# Patient Record
Sex: FEMALE | Race: WHITE | NOT HISPANIC OR LATINO | URBAN - METROPOLITAN AREA
[De-identification: names, ages, dates, MRNs, and addresses within clinical notes are randomized per-mention and may not be internally consistent; named-entity substitution may affect disease eponyms.]

---

## 2017-01-26 ENCOUNTER — INPATIENT (INPATIENT)
Facility: HOSPITAL | Age: 82
LOS: 11 days | Discharge: NURSING, REHAB OR LONG-TERM CARE | End: 2017-02-07
Attending: INTERNAL MEDICINE | Admitting: INTERNAL MEDICINE
Payer: MEDICARE

## 2017-01-26 VITALS
SYSTOLIC BLOOD PRESSURE: 90 MMHG | DIASTOLIC BLOOD PRESSURE: 54 MMHG | TEMPERATURE: 98 F | OXYGEN SATURATION: 99 % | HEART RATE: 104 BPM | RESPIRATION RATE: 16 BRPM

## 2017-01-26 DIAGNOSIS — R41.82 ALTERED MENTAL STATUS, UNSPECIFIED: ICD-10-CM

## 2017-01-26 LAB
ALBUMIN SERPL ELPH-MCNC: 3.2 G/DL — LOW (ref 3.3–5)
ALP SERPL-CCNC: 116 U/L — SIGNIFICANT CHANGE UP (ref 40–120)
ALT FLD-CCNC: 18 U/L — SIGNIFICANT CHANGE UP (ref 4–33)
APPEARANCE UR: SIGNIFICANT CHANGE UP
APTT BLD: 30.3 SEC — SIGNIFICANT CHANGE UP (ref 27.5–37.4)
AST SERPL-CCNC: 25 U/L — SIGNIFICANT CHANGE UP (ref 4–32)
BACTERIA # UR AUTO: SIGNIFICANT CHANGE UP
BASE EXCESS BLDV CALC-SCNC: -10.9 MMOL/L — SIGNIFICANT CHANGE UP
BASOPHILS # BLD AUTO: 0.03 K/UL — SIGNIFICANT CHANGE UP (ref 0–0.2)
BASOPHILS NFR BLD AUTO: 0.2 % — SIGNIFICANT CHANGE UP (ref 0–2)
BILIRUB SERPL-MCNC: 0.3 MG/DL — SIGNIFICANT CHANGE UP (ref 0.2–1.2)
BILIRUB UR-MCNC: NEGATIVE — SIGNIFICANT CHANGE UP
BLOOD GAS VENOUS - CREATININE: 6.89 MG/DL — HIGH (ref 0.5–1.3)
BLOOD UR QL VISUAL: HIGH
BUN SERPL-MCNC: 108 MG/DL — HIGH (ref 7–23)
CALCIUM SERPL-MCNC: 9.5 MG/DL — SIGNIFICANT CHANGE UP (ref 8.4–10.5)
CHLORIDE BLDV-SCNC: 109 MMOL/L — HIGH (ref 96–108)
CHLORIDE SERPL-SCNC: 100 MMOL/L — SIGNIFICANT CHANGE UP (ref 98–107)
CO2 SERPL-SCNC: 14 MMOL/L — LOW (ref 22–31)
COLOR SPEC: YELLOW — SIGNIFICANT CHANGE UP
CREAT SERPL-MCNC: 6.22 MG/DL — HIGH (ref 0.5–1.3)
EOSINOPHIL # BLD AUTO: 0.09 K/UL — SIGNIFICANT CHANGE UP (ref 0–0.5)
EOSINOPHIL NFR BLD AUTO: 0.7 % — SIGNIFICANT CHANGE UP (ref 0–6)
GAS PNL BLDV: 139 MMOL/L — SIGNIFICANT CHANGE UP (ref 136–146)
GLUCOSE BLDV-MCNC: 78 — SIGNIFICANT CHANGE UP (ref 70–99)
GLUCOSE SERPL-MCNC: 78 MG/DL — SIGNIFICANT CHANGE UP (ref 70–99)
GLUCOSE UR-MCNC: NEGATIVE — SIGNIFICANT CHANGE UP
HCO3 BLDV-SCNC: 15 MMOL/L — LOW (ref 20–27)
HCT VFR BLD CALC: 36.9 % — SIGNIFICANT CHANGE UP (ref 34.5–45)
HCT VFR BLDV CALC: 35.1 % — SIGNIFICANT CHANGE UP (ref 34.5–45)
HGB BLD-MCNC: 11.9 G/DL — SIGNIFICANT CHANGE UP (ref 11.5–15.5)
HGB BLDV-MCNC: 11.4 G/DL — LOW (ref 11.5–15.5)
IMM GRANULOCYTES NFR BLD AUTO: 0.4 % — SIGNIFICANT CHANGE UP (ref 0–1.5)
INR BLD: 1.15 — SIGNIFICANT CHANGE UP (ref 0.87–1.18)
KETONES UR-MCNC: SIGNIFICANT CHANGE UP
LACTATE BLDV-MCNC: 3.8 MMOL/L — HIGH (ref 0.5–2)
LEUKOCYTE ESTERASE UR-ACNC: HIGH
LYMPHOCYTES # BLD AUTO: 31.9 % — SIGNIFICANT CHANGE UP (ref 13–44)
LYMPHOCYTES # BLD AUTO: 4.19 K/UL — HIGH (ref 1–3.3)
MCHC RBC-ENTMCNC: 27.9 PG — SIGNIFICANT CHANGE UP (ref 27–34)
MCHC RBC-ENTMCNC: 32.2 % — SIGNIFICANT CHANGE UP (ref 32–36)
MCV RBC AUTO: 86.4 FL — SIGNIFICANT CHANGE UP (ref 80–100)
MONOCYTES # BLD AUTO: 0.87 K/UL — SIGNIFICANT CHANGE UP (ref 0–0.9)
MONOCYTES NFR BLD AUTO: 6.6 % — SIGNIFICANT CHANGE UP (ref 2–14)
NEUTROPHILS # BLD AUTO: 7.9 K/UL — HIGH (ref 1.8–7.4)
NEUTROPHILS NFR BLD AUTO: 60.2 % — SIGNIFICANT CHANGE UP (ref 43–77)
NITRITE UR-MCNC: NEGATIVE — SIGNIFICANT CHANGE UP
PCO2 BLDV: 41 MMHG — SIGNIFICANT CHANGE UP (ref 41–51)
PH BLDV: 7.21 PH — LOW (ref 7.32–7.43)
PH UR: 8 — SIGNIFICANT CHANGE UP (ref 4.6–8)
PLATELET # BLD AUTO: 319 K/UL — SIGNIFICANT CHANGE UP (ref 150–400)
PMV BLD: 9.9 FL — SIGNIFICANT CHANGE UP (ref 7–13)
PO2 BLDV: 26 MMHG — LOW (ref 35–40)
POTASSIUM BLDV-SCNC: 5.8 MMOL/L — HIGH (ref 3.4–4.5)
POTASSIUM SERPL-MCNC: 6.5 MMOL/L — CRITICAL HIGH (ref 3.5–5.3)
POTASSIUM SERPL-SCNC: 6.5 MMOL/L — CRITICAL HIGH (ref 3.5–5.3)
PROT SERPL-MCNC: 8.1 G/DL — SIGNIFICANT CHANGE UP (ref 6–8.3)
PROT UR-MCNC: 300 — HIGH
PROTHROM AB SERPL-ACNC: 13.1 SEC — SIGNIFICANT CHANGE UP (ref 10–13.1)
RBC # BLD: 4.27 M/UL — SIGNIFICANT CHANGE UP (ref 3.8–5.2)
RBC # FLD: 17.3 % — HIGH (ref 10.3–14.5)
RBC CASTS # UR COMP ASSIST: SIGNIFICANT CHANGE UP (ref 0–?)
SAO2 % BLDV: 35.9 % — LOW (ref 60–85)
SODIUM SERPL-SCNC: 141 MMOL/L — SIGNIFICANT CHANGE UP (ref 135–145)
SP GR SPEC: 1.01 — SIGNIFICANT CHANGE UP (ref 1–1.03)
UROBILINOGEN FLD QL: NORMAL E.U. — SIGNIFICANT CHANGE UP (ref 0.1–0.2)
WBC # BLD: 13.13 K/UL — HIGH (ref 3.8–10.5)
WBC # FLD AUTO: 13.13 K/UL — HIGH (ref 3.8–10.5)
WBC CLUMPS #/AREA URNS HPF: PRESENT — HIGH (ref 0–?)
WBC UR QL: >50 — HIGH (ref 0–?)

## 2017-01-26 RX ORDER — CALCIUM GLUCONATE 100 MG/ML
1 VIAL (ML) INTRAVENOUS ONCE
Qty: 0 | Refills: 0 | Status: COMPLETED | OUTPATIENT
Start: 2017-01-26 | End: 2017-01-26

## 2017-01-26 RX ORDER — VANCOMYCIN HCL 1 G
1000 VIAL (EA) INTRAVENOUS ONCE
Qty: 0 | Refills: 0 | Status: COMPLETED | OUTPATIENT
Start: 2017-01-26 | End: 2017-01-26

## 2017-01-26 RX ORDER — PIPERACILLIN AND TAZOBACTAM 4; .5 G/20ML; G/20ML
3.38 INJECTION, POWDER, LYOPHILIZED, FOR SOLUTION INTRAVENOUS ONCE
Qty: 0 | Refills: 0 | Status: COMPLETED | OUTPATIENT
Start: 2017-01-26 | End: 2017-01-26

## 2017-01-26 RX ORDER — SODIUM CHLORIDE 9 MG/ML
1000 INJECTION INTRAMUSCULAR; INTRAVENOUS; SUBCUTANEOUS ONCE
Qty: 0 | Refills: 0 | Status: COMPLETED | OUTPATIENT
Start: 2017-01-26 | End: 2017-01-26

## 2017-01-26 RX ORDER — DEXTROSE 50 % IN WATER 50 %
50 SYRINGE (ML) INTRAVENOUS ONCE
Qty: 0 | Refills: 0 | Status: COMPLETED | OUTPATIENT
Start: 2017-01-26 | End: 2017-01-26

## 2017-01-26 RX ORDER — SODIUM BICARBONATE 1 MEQ/ML
50 SYRINGE (ML) INTRAVENOUS ONCE
Qty: 0 | Refills: 0 | Status: COMPLETED | OUTPATIENT
Start: 2017-01-26 | End: 2017-01-26

## 2017-01-26 RX ORDER — INSULIN HUMAN 100 [IU]/ML
5 INJECTION, SOLUTION SUBCUTANEOUS ONCE
Qty: 0 | Refills: 0 | Status: DISCONTINUED | OUTPATIENT
Start: 2017-01-26 | End: 2017-01-26

## 2017-01-26 RX ORDER — INSULIN HUMAN 100 [IU]/ML
5 INJECTION, SOLUTION SUBCUTANEOUS ONCE
Qty: 0 | Refills: 0 | Status: COMPLETED | OUTPATIENT
Start: 2017-01-26 | End: 2017-01-26

## 2017-01-26 RX ADMIN — Medication 50 MILLILITER(S): at 23:44

## 2017-01-26 RX ADMIN — INSULIN HUMAN 5 UNIT(S): 100 INJECTION, SOLUTION SUBCUTANEOUS at 23:44

## 2017-01-26 RX ADMIN — PIPERACILLIN AND TAZOBACTAM 200 GRAM(S): 4; .5 INJECTION, POWDER, LYOPHILIZED, FOR SOLUTION INTRAVENOUS at 23:00

## 2017-01-26 RX ADMIN — Medication 50 MILLIEQUIVALENT(S): at 23:44

## 2017-01-26 RX ADMIN — Medication 200 GRAM(S): at 23:43

## 2017-01-26 RX ADMIN — SODIUM CHLORIDE 1000 MILLILITER(S): 9 INJECTION INTRAMUSCULAR; INTRAVENOUS; SUBCUTANEOUS at 22:59

## 2017-01-26 NOTE — ED PROVIDER NOTE - OBJECTIVE STATEMENT
89 yo female PMH of HTN, CAD, CHF, PVD, OA, DJD referred from [facility] [place] 89 yo female PMH of HTN, CAD, CHF, PVD, OA, DJD referred from [facility] for acute renal failure. Pt noted to have Cr of 5, new, suspected sepsis. Pt nonverbal, unable to provide further hx.

## 2017-01-26 NOTE — ED ADULT NURSE NOTE - OBJECTIVE STATEMENT
Pt arrives to TRB. Pt awake, non-verbal, and responds to painful stimuli. Pt arrives with bandages bilaterally to feet. Skin on feet peeling, crusted, numerous minor Stage I and Stage II pressure ulcers noted to feet. Pt arrives with urine soaked diaper. Pts vaginal area grossly inflamed, reddened and skin raw appearing. Skin care performed. Pt str cath per MD order with RN Cinthya and CCRN Lula present. Pts urine extremely cloudy and foul smelling. FS=82 at 9:45pm. Labs as ordered. Vitals as noted including rectal temperature.

## 2017-01-26 NOTE — ED PROVIDER NOTE - CARE PLAN
Principal Discharge DX:	Altered mental status Principal Discharge DX:	Altered mental status  Secondary Diagnosis:	Urinary tract infection, site unspecified  Secondary Diagnosis:	YESSI (acute kidney injury)

## 2017-01-26 NOTE — ED ADULT NURSE NOTE - CHIEF COMPLAINT QUOTE
Pt sent from [facility] for abnormal labs.  Pt hypotensive, awake & alert in triage.  18G saline lock noted to left forearm.  Per paperwork, pt is FULL CODE.

## 2017-01-26 NOTE — ED PROVIDER NOTE - PROGRESS NOTE DETAILS
Pt stable. Labs appreciated, will admit for further workup. Dr Suero and MAR aware.  Yuniel Victoria MD PGY-3 [place]: pt already admitted. BP though lower. No crackles or LE edema. Will give additional L IFV. Notified MAR who will come down to [place] pt.

## 2017-01-26 NOTE — ED ADULT TRIAGE NOTE - CHIEF COMPLAINT QUOTE
Pt sent from [facility] for abnormal labs.  Pt hypotensive, awake & alert in triage.  Per paperwork, pt is FULL CODE. Pt sent from [facility] for abnormal labs.  Pt hypotensive, awake & alert in triage.  18G saline lock noted to left forearm.  Per paperwork, pt is FULL CODE.

## 2017-01-26 NOTE — ED ADULT NURSE NOTE - PMH
CAD (coronary artery disease)    CHF (congestive heart failure)    DJD (degenerative joint disease)    Dry eye syndrome    Hyperlipidemia    Hypertension    Nonhealing nonsurgical wound    Osteopenia    PVD (peripheral vascular disease)    Xerosis of skin

## 2017-01-26 NOTE — ED PROVIDER NOTE - ATTENDING CONTRIBUTION TO CARE
89 yo female alzheimers sent from [facility] for abnormal labs.  Pt hypotensive, awake & alert in triage agitated aggressive rrr cta abd soft pressure ulcers @ rectum //labs urine cxr sepsis work up admit

## 2017-01-27 DIAGNOSIS — E87.5 HYPERKALEMIA: ICD-10-CM

## 2017-01-27 DIAGNOSIS — A41.9 SEPSIS, UNSPECIFIED ORGANISM: ICD-10-CM

## 2017-01-27 DIAGNOSIS — Z41.8 ENCOUNTER FOR OTHER PROCEDURES FOR PURPOSES OTHER THAN REMEDYING HEALTH STATE: ICD-10-CM

## 2017-01-27 DIAGNOSIS — I50.9 HEART FAILURE, UNSPECIFIED: ICD-10-CM

## 2017-01-27 DIAGNOSIS — I10 ESSENTIAL (PRIMARY) HYPERTENSION: ICD-10-CM

## 2017-01-27 DIAGNOSIS — L89.90 PRESSURE ULCER OF UNSPECIFIED SITE, UNSPECIFIED STAGE: ICD-10-CM

## 2017-01-27 DIAGNOSIS — N17.9 ACUTE KIDNEY FAILURE, UNSPECIFIED: ICD-10-CM

## 2017-01-27 DIAGNOSIS — I25.10 ATHEROSCLEROTIC HEART DISEASE OF NATIVE CORONARY ARTERY WITHOUT ANGINA PECTORIS: ICD-10-CM

## 2017-01-27 LAB
BASE EXCESS BLDV CALC-SCNC: -8 MMOL/L — SIGNIFICANT CHANGE UP
BASOPHILS # BLD AUTO: 0.02 K/UL — SIGNIFICANT CHANGE UP (ref 0–0.2)
BASOPHILS NFR BLD AUTO: 0.3 % — SIGNIFICANT CHANGE UP (ref 0–2)
BLD GP AB SCN SERPL QL: NEGATIVE — SIGNIFICANT CHANGE UP
BLOOD GAS VENOUS - CREATININE: 4.84 MG/DL — HIGH (ref 0.5–1.3)
BUN SERPL-MCNC: 104 MG/DL — HIGH (ref 7–23)
BUN SERPL-MCNC: 90 MG/DL — HIGH (ref 7–23)
CALCIUM SERPL-MCNC: 8 MG/DL — LOW (ref 8.4–10.5)
CALCIUM SERPL-MCNC: 8.3 MG/DL — LOW (ref 8.4–10.5)
CHLORIDE BLDV-SCNC: 116 MMOL/L — HIGH (ref 96–108)
CHLORIDE SERPL-SCNC: 104 MMOL/L — SIGNIFICANT CHANGE UP (ref 98–107)
CHLORIDE SERPL-SCNC: 111 MMOL/L — HIGH (ref 98–107)
CO2 SERPL-SCNC: 15 MMOL/L — LOW (ref 22–31)
CO2 SERPL-SCNC: 17 MMOL/L — LOW (ref 22–31)
CREAT SERPL-MCNC: 5.01 MG/DL — HIGH (ref 0.5–1.3)
CREAT SERPL-MCNC: 5.82 MG/DL — HIGH (ref 0.5–1.3)
EOSINOPHIL # BLD AUTO: 0.07 K/UL — SIGNIFICANT CHANGE UP (ref 0–0.5)
EOSINOPHIL NFR BLD AUTO: 1.2 % — SIGNIFICANT CHANGE UP (ref 0–6)
GAS PNL BLDV: 142 MMOL/L — SIGNIFICANT CHANGE UP (ref 136–146)
GLUCOSE BLDV-MCNC: 72 — SIGNIFICANT CHANGE UP (ref 70–99)
GLUCOSE SERPL-MCNC: 238 MG/DL — HIGH (ref 70–99)
GLUCOSE SERPL-MCNC: 69 MG/DL — LOW (ref 70–99)
HBA1C BLD-MCNC: 5.8 % — HIGH (ref 4–5.6)
HCO3 BLDV-SCNC: 17 MMOL/L — LOW (ref 20–27)
HCT VFR BLD CALC: 29.1 % — LOW (ref 34.5–45)
HCT VFR BLDV CALC: 30.6 % — LOW (ref 34.5–45)
HGB BLD-MCNC: 9.2 G/DL — LOW (ref 11.5–15.5)
HGB BLDV-MCNC: 9.9 G/DL — LOW (ref 11.5–15.5)
IMM GRANULOCYTES NFR BLD AUTO: 0.5 % — SIGNIFICANT CHANGE UP (ref 0–1.5)
LACTATE BLDV-MCNC: 1.2 MMOL/L — SIGNIFICANT CHANGE UP (ref 0.5–2)
LYMPHOCYTES # BLD AUTO: 1.27 K/UL — SIGNIFICANT CHANGE UP (ref 1–3.3)
LYMPHOCYTES # BLD AUTO: 20.9 % — SIGNIFICANT CHANGE UP (ref 13–44)
MAGNESIUM SERPL-MCNC: 2.1 MG/DL — SIGNIFICANT CHANGE UP (ref 1.6–2.6)
MCHC RBC-ENTMCNC: 27.1 PG — SIGNIFICANT CHANGE UP (ref 27–34)
MCHC RBC-ENTMCNC: 31.6 % — LOW (ref 32–36)
MCV RBC AUTO: 85.6 FL — SIGNIFICANT CHANGE UP (ref 80–100)
MONOCYTES # BLD AUTO: 0.42 K/UL — SIGNIFICANT CHANGE UP (ref 0–0.9)
MONOCYTES NFR BLD AUTO: 6.9 % — SIGNIFICANT CHANGE UP (ref 2–14)
NEUTROPHILS # BLD AUTO: 4.26 K/UL — SIGNIFICANT CHANGE UP (ref 1.8–7.4)
NEUTROPHILS NFR BLD AUTO: 70.2 % — SIGNIFICANT CHANGE UP (ref 43–77)
PCO2 BLDV: 37 MMHG — LOW (ref 41–51)
PH BLDV: 7.29 PH — LOW (ref 7.32–7.43)
PHOSPHATE SERPL-MCNC: 3.7 MG/DL — SIGNIFICANT CHANGE UP (ref 2.5–4.5)
PLATELET # BLD AUTO: 258 K/UL — SIGNIFICANT CHANGE UP (ref 150–400)
PMV BLD: 9.4 FL — SIGNIFICANT CHANGE UP (ref 7–13)
PO2 BLDV: 33 MMHG — LOW (ref 35–40)
POTASSIUM BLDV-SCNC: 4.9 MMOL/L — HIGH (ref 3.4–4.5)
POTASSIUM SERPL-MCNC: 4.9 MMOL/L — SIGNIFICANT CHANGE UP (ref 3.5–5.3)
POTASSIUM SERPL-MCNC: 5.1 MMOL/L — SIGNIFICANT CHANGE UP (ref 3.5–5.3)
POTASSIUM SERPL-SCNC: 4.9 MMOL/L — SIGNIFICANT CHANGE UP (ref 3.5–5.3)
POTASSIUM SERPL-SCNC: 5.1 MMOL/L — SIGNIFICANT CHANGE UP (ref 3.5–5.3)
RBC # BLD: 3.4 M/UL — LOW (ref 3.8–5.2)
RBC # FLD: 17.1 % — HIGH (ref 10.3–14.5)
RH IG SCN BLD-IMP: POSITIVE — SIGNIFICANT CHANGE UP
SAO2 % BLDV: 56.8 % — LOW (ref 60–85)
SODIUM SERPL-SCNC: 142 MMOL/L — SIGNIFICANT CHANGE UP (ref 135–145)
SODIUM SERPL-SCNC: 143 MMOL/L — SIGNIFICANT CHANGE UP (ref 135–145)
SPECIMEN SOURCE: SIGNIFICANT CHANGE UP
SPECIMEN SOURCE: SIGNIFICANT CHANGE UP
WBC # BLD: 6.07 K/UL — SIGNIFICANT CHANGE UP (ref 3.8–10.5)
WBC # FLD AUTO: 6.07 K/UL — SIGNIFICANT CHANGE UP (ref 3.8–10.5)

## 2017-01-27 PROCEDURE — 99223 1ST HOSP IP/OBS HIGH 75: CPT | Mod: GC

## 2017-01-27 PROCEDURE — 71010: CPT | Mod: 26

## 2017-01-27 PROCEDURE — 76770 US EXAM ABDO BACK WALL COMP: CPT | Mod: 26

## 2017-01-27 RX ORDER — PIPERACILLIN AND TAZOBACTAM 4; .5 G/20ML; G/20ML
3.38 INJECTION, POWDER, LYOPHILIZED, FOR SOLUTION INTRAVENOUS EVERY 12 HOURS
Qty: 0 | Refills: 0 | Status: DISCONTINUED | OUTPATIENT
Start: 2017-01-27 | End: 2017-02-02

## 2017-01-27 RX ORDER — CLOPIDOGREL BISULFATE 75 MG/1
1 TABLET, FILM COATED ORAL
Qty: 0 | Refills: 0 | COMMUNITY

## 2017-01-27 RX ORDER — SIMVASTATIN 20 MG/1
20 TABLET, FILM COATED ORAL AT BEDTIME
Qty: 0 | Refills: 0 | Status: DISCONTINUED | OUTPATIENT
Start: 2017-01-27 | End: 2017-01-28

## 2017-01-27 RX ORDER — ASPIRIN/CALCIUM CARB/MAGNESIUM 324 MG
1 TABLET ORAL
Qty: 0 | Refills: 0 | COMMUNITY

## 2017-01-27 RX ORDER — CLOPIDOGREL BISULFATE 75 MG/1
75 TABLET, FILM COATED ORAL DAILY
Qty: 0 | Refills: 0 | Status: DISCONTINUED | OUTPATIENT
Start: 2017-01-27 | End: 2017-02-07

## 2017-01-27 RX ORDER — PANTOPRAZOLE SODIUM 20 MG/1
40 TABLET, DELAYED RELEASE ORAL
Qty: 0 | Refills: 0 | Status: DISCONTINUED | OUTPATIENT
Start: 2017-01-27 | End: 2017-02-07

## 2017-01-27 RX ORDER — SODIUM CHLORIDE 9 MG/ML
1000 INJECTION INTRAMUSCULAR; INTRAVENOUS; SUBCUTANEOUS ONCE
Qty: 0 | Refills: 0 | Status: COMPLETED | OUTPATIENT
Start: 2017-01-27 | End: 2017-01-27

## 2017-01-27 RX ORDER — SODIUM POLYSTYRENE SULFONATE 4.1 MEQ/G
15 POWDER, FOR SUSPENSION ORAL ONCE
Qty: 0 | Refills: 0 | Status: COMPLETED | OUTPATIENT
Start: 2017-01-27 | End: 2017-01-27

## 2017-01-27 RX ORDER — OMEPRAZOLE 10 MG/1
1 CAPSULE, DELAYED RELEASE ORAL
Qty: 0 | Refills: 0 | COMMUNITY

## 2017-01-27 RX ORDER — SODIUM CHLORIDE 9 MG/ML
1000 INJECTION INTRAMUSCULAR; INTRAVENOUS; SUBCUTANEOUS
Qty: 0 | Refills: 0 | Status: DISCONTINUED | OUTPATIENT
Start: 2017-01-27 | End: 2017-01-27

## 2017-01-27 RX ORDER — SODIUM CHLORIDE 9 MG/ML
1000 INJECTION INTRAMUSCULAR; INTRAVENOUS; SUBCUTANEOUS ONCE
Qty: 0 | Refills: 0 | Status: DISCONTINUED | OUTPATIENT
Start: 2017-01-27 | End: 2017-01-27

## 2017-01-27 RX ORDER — SODIUM CHLORIDE 9 MG/ML
500 INJECTION INTRAMUSCULAR; INTRAVENOUS; SUBCUTANEOUS ONCE
Qty: 0 | Refills: 0 | Status: DISCONTINUED | OUTPATIENT
Start: 2017-01-27 | End: 2017-01-27

## 2017-01-27 RX ORDER — HEPARIN SODIUM 5000 [USP'U]/ML
5000 INJECTION INTRAVENOUS; SUBCUTANEOUS EVERY 12 HOURS
Qty: 0 | Refills: 0 | Status: DISCONTINUED | OUTPATIENT
Start: 2017-01-27 | End: 2017-02-07

## 2017-01-27 RX ORDER — MIRTAZAPINE 45 MG/1
1 TABLET, ORALLY DISINTEGRATING ORAL
Qty: 0 | Refills: 0 | COMMUNITY

## 2017-01-27 RX ORDER — HYDROCORTISONE 1 %
1 OINTMENT (GRAM) TOPICAL
Qty: 0 | Refills: 0 | Status: DISCONTINUED | OUTPATIENT
Start: 2017-01-27 | End: 2017-02-07

## 2017-01-27 RX ORDER — NYSTATIN CREAM 100000 [USP'U]/G
1 CREAM TOPICAL
Qty: 0 | Refills: 0 | Status: DISCONTINUED | OUTPATIENT
Start: 2017-01-27 | End: 2017-02-07

## 2017-01-27 RX ORDER — HYDROCORTISONE 1 %
1 OINTMENT (GRAM) TOPICAL
Qty: 0 | Refills: 0 | COMMUNITY

## 2017-01-27 RX ORDER — ASPIRIN/CALCIUM CARB/MAGNESIUM 324 MG
81 TABLET ORAL DAILY
Qty: 0 | Refills: 0 | Status: DISCONTINUED | OUTPATIENT
Start: 2017-01-27 | End: 2017-02-07

## 2017-01-27 RX ORDER — ACETAMINOPHEN 500 MG
650 TABLET ORAL EVERY 6 HOURS
Qty: 0 | Refills: 0 | Status: DISCONTINUED | OUTPATIENT
Start: 2017-01-27 | End: 2017-02-07

## 2017-01-27 RX ORDER — MIDODRINE HYDROCHLORIDE 2.5 MG/1
10 TABLET ORAL THREE TIMES A DAY
Qty: 0 | Refills: 0 | Status: DISCONTINUED | OUTPATIENT
Start: 2017-01-27 | End: 2017-02-03

## 2017-01-27 RX ORDER — SODIUM CHLORIDE 9 MG/ML
1000 INJECTION, SOLUTION INTRAVENOUS
Qty: 0 | Refills: 0 | Status: DISCONTINUED | OUTPATIENT
Start: 2017-01-27 | End: 2017-01-29

## 2017-01-27 RX ORDER — MIRTAZAPINE 45 MG/1
15 TABLET, ORALLY DISINTEGRATING ORAL AT BEDTIME
Qty: 0 | Refills: 0 | Status: DISCONTINUED | OUTPATIENT
Start: 2017-01-27 | End: 2017-02-07

## 2017-01-27 RX ADMIN — Medication 250 MILLIGRAM(S): at 00:52

## 2017-01-27 RX ADMIN — SODIUM CHLORIDE 150 MILLILITER(S): 9 INJECTION INTRAMUSCULAR; INTRAVENOUS; SUBCUTANEOUS at 15:56

## 2017-01-27 RX ADMIN — HEPARIN SODIUM 5000 UNIT(S): 5000 INJECTION INTRAVENOUS; SUBCUTANEOUS at 07:17

## 2017-01-27 RX ADMIN — Medication 1 APPLICATION(S): at 22:12

## 2017-01-27 RX ADMIN — Medication 1 DROP(S): at 22:13

## 2017-01-27 RX ADMIN — MIRTAZAPINE 15 MILLIGRAM(S): 45 TABLET, ORALLY DISINTEGRATING ORAL at 22:12

## 2017-01-27 RX ADMIN — Medication 1 APPLICATION(S): at 13:48

## 2017-01-27 RX ADMIN — Medication 1 DROP(S): at 07:17

## 2017-01-27 RX ADMIN — SODIUM CHLORIDE 1000 MILLILITER(S): 9 INJECTION INTRAMUSCULAR; INTRAVENOUS; SUBCUTANEOUS at 04:41

## 2017-01-27 RX ADMIN — SIMVASTATIN 20 MILLIGRAM(S): 20 TABLET, FILM COATED ORAL at 22:19

## 2017-01-27 RX ADMIN — Medication 1 APPLICATION(S): at 22:14

## 2017-01-27 RX ADMIN — SODIUM CHLORIDE 1000 MILLILITER(S): 9 INJECTION INTRAMUSCULAR; INTRAVENOUS; SUBCUTANEOUS at 13:48

## 2017-01-27 RX ADMIN — SODIUM CHLORIDE 1000 MILLILITER(S): 9 INJECTION INTRAMUSCULAR; INTRAVENOUS; SUBCUTANEOUS at 03:31

## 2017-01-27 RX ADMIN — NYSTATIN CREAM 1 APPLICATION(S): 100000 CREAM TOPICAL at 07:18

## 2017-01-27 RX ADMIN — NYSTATIN CREAM 1 APPLICATION(S): 100000 CREAM TOPICAL at 22:14

## 2017-01-27 RX ADMIN — HEPARIN SODIUM 5000 UNIT(S): 5000 INJECTION INTRAVENOUS; SUBCUTANEOUS at 18:41

## 2017-01-27 RX ADMIN — SODIUM CHLORIDE 50 MILLILITER(S): 9 INJECTION INTRAMUSCULAR; INTRAVENOUS; SUBCUTANEOUS at 01:59

## 2017-01-27 RX ADMIN — PIPERACILLIN AND TAZOBACTAM 25 GRAM(S): 4; .5 INJECTION, POWDER, LYOPHILIZED, FOR SOLUTION INTRAVENOUS at 11:04

## 2017-01-27 RX ADMIN — PIPERACILLIN AND TAZOBACTAM 25 GRAM(S): 4; .5 INJECTION, POWDER, LYOPHILIZED, FOR SOLUTION INTRAVENOUS at 22:19

## 2017-01-27 RX ADMIN — Medication 1 APPLICATION(S): at 07:17

## 2017-01-27 NOTE — PHYSICAL THERAPY INITIAL EVALUATION ADULT - PERTINENT HX OF CURRENT PROBLEM, REHAB EVAL
This is a 89 y/o F hx of HTN, CAD, CHF, PVD, OA, DJD, depression presents with severe sepsis, most likely 2/2 to UTI

## 2017-01-27 NOTE — H&P ADULT. - ATTENDING COMMENTS
89 y/o F hx of HTN, CAD, CHF, PVD, OA, DJD, depression presents  send from NH to ER due to letargy and abnormall lab value.  Pt labs shows she . Baseline pt ambulates in wheelchair, is able to make needs made despite severe dementia. Pt was straight cath, and pt had cloudy urine per nurse manager.    at time of exam 10:30 she is awake and verbal wants water     In ED vitals were 96.4, 104, 90/54, 16, 99 RA  Labs significant for leukocytosis of 13, hyperkalemia int he 6s, Cr/BUN of 6.22/108, lactate 3.8, UA grossly positive for UTI   EKG showed SD interval of 240, first degree HB, left axis deviation, NSR of 98  Pt given insulin regular 5 units, 1 amp of D50, sodium bicarb. Repeat K 5.1, vanco, zosyn, 1 L NS bolus    MICU consult noted will admitte to [facility]  d/w  er and medical team

## 2017-01-27 NOTE — ED ADULT NURSE REASSESSMENT NOTE - NS ED NURSE REASSESS COMMENT FT1
As per MD Domonique maintenance fluids paused and NS bolus initiated. BP 77/38, MICU called for consult. Will continue to monitor for safety and comfort.

## 2017-01-27 NOTE — ED ADULT NURSE REASSESSMENT NOTE - NS ED NURSE REASSESS COMMENT FT1
BP low, spoke with ADS MD at 10:30am, pt non -symtomatic, no s/s of distress, pt awake and calm. plan to monitor, MD to see pt soon.

## 2017-01-27 NOTE — PHYSICAL THERAPY INITIAL EVALUATION ADULT - CRITERIA FOR SKILLED THERAPEUTIC INTERVENTIONS
Patient is not a candidate for [facility] PT due to inability to follow vc's and  patient at baseline ;wc  bounded

## 2017-01-27 NOTE — PHYSICAL THERAPY INITIAL EVALUATION ADULT - GENERAL OBSERVATIONS, REHAB EVAL
Patient received semi supine in bed , in NAD, (+) dressing on (B) feet ,legs crossed over, attempted to uncross the leg but patient crosses it again, unable to bend (R) knee, extension contracture , discomfort to touch on (B) feet due to ulcers, unable to follow directions,A& O x 0.

## 2017-01-27 NOTE — H&P ADULT. - PMH
CAD (coronary artery disease)    CHF (congestive heart failure)    Depression    Depression    DJD (degenerative joint disease)    Dry eye syndrome    Hyperlipidemia    Hypertension    Nonhealing nonsurgical wound    Osteopenia    PVD (peripheral vascular disease)    Xerosis of skin

## 2017-01-27 NOTE — PHYSICAL THERAPY INITIAL EVALUATION ADULT - PASSIVE RANGE OF MOTION EXAMINATION, REHAB EVAL
(R) LE maintained in extension with (L) leg cross over , appears to have (R) LE extension contracture./deficits as listed below/bilateral upper extremity Passive ROM was WFL (within functional limits)/Left LE Passive ROM was WFL (within functional limits)

## 2017-01-27 NOTE — H&P ADULT. - PROBLEM SELECTOR PLAN 1
-pt has leukocytosis, hypothermia, tachycardia in setting of UTI with YESSI, pt meets sepsis criteria  -most likely source is UTI, but must complete sepsis work up with CXR given pt cannot provide hx and could not obtain complete information from nurse manager/charts from nursing home -pt has leukocytosis, hypothermia, tachycardia in setting of UTI with YESSI, pt meets sepsis criteria  -most likely source is UTI, but must complete sepsis work up with CXR given pt cannot provide hx and could not obtain complete information from nurse manager/charts from nursing home  -will cover broadly for now until CXR is done with vanc (vanc by level given severe YESSI) /zosyna; if negative for PNA, will deescalate to ceftriaxone; if positive for PNA will add azithromycin  -f/u blood cx, urine cx; will obtain urine legionella, RVP if positive for PNA  -gentle IVF for insensible losses

## 2017-01-27 NOTE — ADVANCED PRACTICE NURSE CONSULT - REASON FOR CONSULT
Patient seen on skin care rounds after wound care referral received for assessment of skin impairment and recommendations of topical management. Chart reviewed: Serum albumin 3.2g/dL, Dennys 9, patient nonverbal. Patient H/O HTN, CAD, CHF, PVD, OA, DJD, depression presents from NH because of abdominal lab value. Baseline pt ambulates in wheelchair, is able to make needs made despite severe dementia. Admitted from nursing home with change in mental status, patient hypotensive on admission, suspected sepsis.

## 2017-01-27 NOTE — H&P ADULT. - PROBLEM SELECTOR PLAN 2
-most likely prerenal vs ATN given sepsis  -will obtain urine studies, renal US to r/o obstruction  -will trend Cr in setting of fluids  -avoid nephrotoxins  -consult nephro if Cr not downtrending

## 2017-01-27 NOTE — H&P ADULT. - PROBLEM SELECTOR PLAN 3
-6.7 to 5.1 s/p bicarb, insulin + dextrose  -will monitor K q12h  -will place pt on telemetry  -with resolving YESSI, might not need kayexalate, will follow up am BMP -6.7 to 5.1 s/p bicarb, insulin + dextrose  -will monitor K q12h  -will place pt on telemetry  -will give kayexalate 15 mg X 1

## 2017-01-27 NOTE — H&P ADULT. - ASSESSMENT
89 y/o F hx of HTN, CAD, CHF, PVD, OA, DJD, depression presents with severe sepsis, most likely 2/2 to UTI

## 2017-01-27 NOTE — ADVANCED PRACTICE NURSE CONSULT - RECOMMEDATIONS
Recommend Podiatry or Vascular consult for further evaluation of B/L feet.    Discontinue hydrocortisone cream.    Right foot: Cleanse with SAF-clens, rinse with NS, pat dry. Apply Liquid barrier film to periwound skin. Apply Aquacel AG hydrofiber to wound bases, Cover with ABD (Combine) pad and secure with Kerlix. Change daily.    Left foot: Cleanse with SAF-clens, rinse with NS, pat dry. Apply Liquid barrier film to periwound skin. Apply Medihoney gel to wound base, cover with gauze and secure with Kerlix. Change daily.    Left heel: Cleanse with NS, pat dry. Apply Liquid barrier film to periwound skin and injury. Cover with ABD (Combine) pad and secure with Kerlix. Change daily.    Initiate low air loss bed therapy, initiate heel elevation, continue to turn & reposition q2h, soft pillow between bony prominences, continue moisture management with barrier ointment & single breathable pad, continue measures to decrease friction/shear/pressure.    Please call wound care service line is further assistance is needed ([phone]).

## 2017-01-27 NOTE — ED ADULT NURSE REASSESSMENT NOTE - NS ED NURSE REASSESS COMMENT FT1
Pt cleaned, nystatin powder applied top perineal area, pt turned and repositioned. Pt arrived with bilateral bandages in place to feet; bandages removed and pt noted to have numerous stage 2 pressure ulcers to Dorsal & Ventral Left foot as well as a stage 2 pressure ulcer on Right great toe and stage 2 ulcer between second and third digits on right foot. Hydrocortisone cream & Levy dine cream applied as ordered, patients feet re-bandaged with dressing dated. VS as noted. Will continue to monitor for safety and comfort.

## 2017-01-27 NOTE — ADVANCED PRACTICE NURSE CONSULT - ASSESSMENT
A&Ox0, bedbound, incontinent of urine and stool. Skin warm, dry with increased moisture in intertriginous folds, poor skin turgor, scattered areas of hyperpigmentation and hypopigmentation of B/L LE, scattered areas of ecchymosis on bilateral upper extremities. B/L UE contracted/rigid. RLE contracted over LLE. B/L LE rigid. Linear scabs noted to B/L shoulders.  Bilateral lower extremities with scattered areas of hyperpigmentation and hypopigmentation. Dry, flaky skin of right foot. Thin, shiny, taught skin. Thickened-yellow hyperpigmented overgrown toenails of right foot. Overgrown toenails of left foot (not hypertrophic and no discoloration of left toenails). Multiple wounds present. RLE from ankle to distal toes cool to touch, left foot from midfoot to toes cool to touch. No edema. Capillary refill >3 seconds.         B/L DP/PT pulses nonpalapble, with monophasic doppler sounds.  Right foot with multiple mixed etiology of arterial and pressure ulcerations: Right lateral foot measures 9ldv4wxr6.2cm. Lateral/proximal foot extending to dorsal foot measures 2.7afq4qrf5.2cm. Right dorsal foot measures 5.5vkc6org3.2cm, Medial foot measures 8fof4vcd2.2cm. Plantar foot measures 2.2wxf4rup7.2cm. Heel (mixed etiology of pressure and arterial sources) measures 4hdm7wct8.2cm. Wound bases are 100% flat, friable, pale pink with fibrin film. Large amount of serosanguinous/purulent drainage. Wound borders are well defined. Periwound skin is hypopigmentation, with hyperpigmentation and scattered areas of blanchable erythema of right foot. No increased warmth, no induration in periwound skin. Goal of care: decrease/control bioburden, manage exudate.  Right toes: Dorsal aspect of first toe with 1.5ggh6xn 100% eschar. Dorsal aspect of 2nd,3rd and 4th toes with blanchable erythema. Lateral aspect of 5th toe, and medial aspect of first toe extending to medial foot with scattered areas of purple/blue discoloration. Thickened, yellow toenails. Cool temperature from ankle to distal tips of toes. 2nd and 3rd toes with metatarsal head amputations.  Left foot with multiple arterial ulcerations of the toes: Medial great toe measures 1.4ibu6tnz8.2cm: wound base 20% slough (yellow/tan firmly attached to wound base), 80% flat, friable, pale pink tissue. 1st metatarsal head 0.3cmx0.3cmx0.2cm with 100% exposed ligament. Dorsal aspect of 2nd toe measure 0.0daz9qay8.2cm & dorsal aspect of 3rd toe measures 0.5cmx0.5cmx0.2cm & dorsal aspect of 4th toe measures 0.2cmx0.2cmx0.2cm. (dorsal aspect of 2nd-4th toe wound base presenting the same as 100% pale pink, friable, flat, agranular). Periwound skin with circumferential blanchable erythema. Small amount of serosanguinous drainage. Goal of care: decrease/control bioburden, autolytic debridement of necrotic tissue, manage exudate.  Left heel mixed etiology of pressure and arterial ulceration: DTPI measures 7vxq5cq, intact epidermis. Purple/blue discoloration. Periwound skin with blanchable erythema that extends 3cm circumferentially No induration, no increased warmth. Goal of care: monitor for changes in tissue type.  Sacral fold extending to B/L buttock with scattered areas of hypopigmentation from previous ulcerations. B/L ischium with hypopigmentation and soft tissue contracture.   Mixed etiology of incontinence and moisture associated dermatitis of sacral fold extending to b/l buttock and groin. Hyperpigmentation, moisture noted, skin intact. A&Ox0, bedbound, incontinent of urine and stool. Skin warm, dry with increased moisture in intertriginous folds, poor skin turgor, scattered areas of hyperpigmentation and hypopigmentation of B/L LE, scattered areas of ecchymosis on bilateral upper extremities. B/L UE contracted/rigid. RLE contracted over LLE. B/L LE rigid. Linear scabs noted to B/L shoulders.  Bilateral lower extremities with scattered areas of hyperpigmentation and hypopigmentation. Dry, flaky skin of right foot. Thin, shiny, taught skin. Thickened-yellow hyperpigmented overgrown toenails of right foot. Overgrown toenails of left foot (not hypertrophic and no discoloration of left toenails). Multiple wounds present. RLE from ankle to distal toes cool to touch, left foot from midfoot to toes cool to touch. No edema. Capillary refill >3 seconds.         B/L DP/PT pulses nonpalapble, with monophasic doppler sounds.  Right foot with multiple mixed etiology of arterial and pressure ulcerations: Right lateral foot measures 5ajp9zvt1.2cm. Lateral/proximal foot extending to dorsal foot measures 2.0rpv1cmk9.2cm. Right dorsal foot measures 5.4yzl1nib0.2cm, Medial foot measures 5dda3frv4.2cm. Plantar foot measures 2.7qqr6fgh3.2cm. Heel (mixed etiology of pressure and arterial sources) measures 6gwx0ttj0.2cm. Wound bases are 100% flat, friable, pale pink with fibrin film. Large amount of serosanguinous/purulent drainage. Wound borders are well defined. Periwound skin is hypopigmentation, with hyperpigmentation and scattered areas of blanchable erythema of right foot. No increased warmth, no induration in periwound skin. Goal of care: decrease/control bioburden, manage exudate.  Right toes: Dorsal aspect of first toe with 1.7psh0ds 100% eschar. Dorsal aspect of 2nd,3rd and 4th toes with blanchable erythema. Lateral aspect of 5th toe, and medial aspect of first toe extending to medial foot with scattered areas of purple/blue discoloration. Thickened, yellow toenails. Cool temperature from ankle to distal tips of toes. 2nd and 3rd toes with metatarsal head amputations.  Left foot with multiple arterial ulcerations of the toes: Medial great toe measures 1.9qcx5whp5.2cm: wound base 20% slough (yellow/tan firmly attached to wound base), 80% flat, friable, pale pink tissue. 1st metatarsal head 0.3cmx0.3cmx0.2cm with 100% exposed ligament. Dorsal aspect of 2nd toe measure 0.8xlz2bdl9.2cm & dorsal aspect of 3rd toe measures 0.5cmx0.5cmx0.2cm & dorsal aspect of 4th toe measures 0.2cmx0.2cmx0.2cm. (dorsal aspect of 2nd-4th toe wound base presenting the same as 100% pale pink, friable, flat, agranular). Periwound skin with circumferential blanchable erythema. Small amount of serosanguinous drainage. Goal of care: decrease/control bioburden, autolytic debridement of necrotic tissue, manage exudate.  Left heel mixed etiology of pressure and arterial ulceration: DTPI measures 6dvf1id, intact epidermis. Purple/blue discoloration. Periwound skin with blanchable erythema that extends 3cm circumferentially No induration, no increased warmth. Goal of care: monitor for changes in tissue type.  Sacral fold extending to B/L buttock with scattered areas of hypopigmentation from previous ulcerations. B/L ischium with hypopigmentation and soft tissue contracture.   Mixed etiology of incontinence and moisture associated dermatitis of sacral fold extending to b/l buttock and groin. Hyperpigmentation, moisture noted, skin intact.

## 2017-01-27 NOTE — ED ADULT NURSE REASSESSMENT NOTE - NS ED NURSE REASSESS COMMENT FT1
Pt cleaned, changed and positioned. VS as noted. BP 87/45: MAR MD notified, states will come to assess. Rectal temp 96.8. Will continue to monitor for safety and comfort.

## 2017-01-27 NOTE — H&P ADULT. - PROBLEM SELECTOR PLAN 7
-gentle IV hydration  -will obtain TTE as no records of baseline heart function   -will hold lisinopril in setting of YESSI

## 2017-01-27 NOTE — ED ADULT NURSE REASSESSMENT NOTE - NS ED NURSE REASSESS COMMENT FT1
break coverage RN: ADS called, MD aware of BP. requesting MICU consult due to hypotension. ADS states pts condition does not warrant a MICU consult due to patient receiving one earlier this morning. escalated to LUIS ENRIQUE Stapleton and MARCELA Gurrola.

## 2017-01-27 NOTE — H&P ADULT. - HISTORY OF PRESENT ILLNESS
Hx obtained from charts, and nurse manager, as tried to contact emergency contacts, and they did not answer phone. Pt currently cannot communicate because of AMS. 89 y/o F hx of HTN, CAD, CHF, PVD, OA, DJD, depression presents from NH because of abdominal lab value.  Pt labs shows she was found to have a Cr of 5. Per nursing manager, not sure if pt was symptomatic but did notice a decline in ability to perform ADLs for past 1-2. Baseline pt ambulates in wheelchair, is able to make needs made despite severe dementia. Pt was straight cath, and pt had cloudy urine per nurse manager.     In ED vitals were 96.4, 104, 90/54, 16, 99 RA  Labs significant for leukocytosis of 13, hyperkalemia int he 6s, Cr/BUN of 6.22/108, lactate 3.8, UA grossly positive for UTI   EKG showed VA interval of 240, first degree HB, left axis deviation, NSR of 98  Pt given insulin regular 5 units, 1 amp of D50, sodium bicarb. Repeat K 5.1, vanco, zosyn, 1 L NS bolus

## 2017-01-28 LAB
BUN SERPL-MCNC: 66 MG/DL — HIGH (ref 7–23)
CALCIUM SERPL-MCNC: 8.5 MG/DL — SIGNIFICANT CHANGE UP (ref 8.4–10.5)
CHLORIDE SERPL-SCNC: 116 MMOL/L — HIGH (ref 98–107)
CO2 SERPL-SCNC: 15 MMOL/L — LOW (ref 22–31)
CREAT SERPL-MCNC: 3.01 MG/DL — HIGH (ref 0.5–1.3)
CRP SERPL-MCNC: 68.7 MG/L — HIGH (ref 0.3–5)
ERYTHROCYTE [SEDIMENTATION RATE] IN BLOOD: 75 MM/HR — HIGH (ref 4–25)
GLUCOSE SERPL-MCNC: 69 MG/DL — LOW (ref 70–99)
HCT VFR BLD CALC: 29.7 % — LOW (ref 34.5–45)
HGB BLD-MCNC: 9.3 G/DL — LOW (ref 11.5–15.5)
MCHC RBC-ENTMCNC: 27 PG — SIGNIFICANT CHANGE UP (ref 27–34)
MCHC RBC-ENTMCNC: 31.3 % — LOW (ref 32–36)
MCV RBC AUTO: 86.3 FL — SIGNIFICANT CHANGE UP (ref 80–100)
PLATELET # BLD AUTO: 230 K/UL — SIGNIFICANT CHANGE UP (ref 150–400)
PMV BLD: 9 FL — SIGNIFICANT CHANGE UP (ref 7–13)
POTASSIUM SERPL-MCNC: 4.6 MMOL/L — SIGNIFICANT CHANGE UP (ref 3.5–5.3)
POTASSIUM SERPL-SCNC: 4.6 MMOL/L — SIGNIFICANT CHANGE UP (ref 3.5–5.3)
RBC # BLD: 3.44 M/UL — LOW (ref 3.8–5.2)
RBC # FLD: 17.7 % — HIGH (ref 10.3–14.5)
SODIUM SERPL-SCNC: 146 MMOL/L — HIGH (ref 135–145)
SPECIMEN SOURCE: SIGNIFICANT CHANGE UP
WBC # BLD: 3.68 K/UL — LOW (ref 3.8–10.5)
WBC # FLD AUTO: 3.68 K/UL — LOW (ref 3.8–10.5)

## 2017-01-28 PROCEDURE — 73630 X-RAY EXAM OF FOOT: CPT | Mod: 26,50

## 2017-01-28 RX ORDER — SODIUM CHLORIDE 9 MG/ML
1000 INJECTION INTRAMUSCULAR; INTRAVENOUS; SUBCUTANEOUS
Qty: 0 | Refills: 0 | Status: DISCONTINUED | OUTPATIENT
Start: 2017-01-28 | End: 2017-01-29

## 2017-01-28 RX ADMIN — Medication 1 APPLICATION(S): at 21:32

## 2017-01-28 RX ADMIN — Medication 81 MILLIGRAM(S): at 14:02

## 2017-01-28 RX ADMIN — MIDODRINE HYDROCHLORIDE 10 MILLIGRAM(S): 2.5 TABLET ORAL at 21:32

## 2017-01-28 RX ADMIN — Medication 1 TABLET(S): at 14:03

## 2017-01-28 RX ADMIN — PANTOPRAZOLE SODIUM 40 MILLIGRAM(S): 20 TABLET, DELAYED RELEASE ORAL at 06:22

## 2017-01-28 RX ADMIN — MIRTAZAPINE 15 MILLIGRAM(S): 45 TABLET, ORALLY DISINTEGRATING ORAL at 21:32

## 2017-01-28 RX ADMIN — PIPERACILLIN AND TAZOBACTAM 25 GRAM(S): 4; .5 INJECTION, POWDER, LYOPHILIZED, FOR SOLUTION INTRAVENOUS at 12:05

## 2017-01-28 RX ADMIN — Medication 1 DROP(S): at 18:56

## 2017-01-28 RX ADMIN — Medication 1 APPLICATION(S): at 06:22

## 2017-01-28 RX ADMIN — MIDODRINE HYDROCHLORIDE 10 MILLIGRAM(S): 2.5 TABLET ORAL at 06:22

## 2017-01-28 RX ADMIN — HEPARIN SODIUM 5000 UNIT(S): 5000 INJECTION INTRAVENOUS; SUBCUTANEOUS at 06:22

## 2017-01-28 RX ADMIN — HEPARIN SODIUM 5000 UNIT(S): 5000 INJECTION INTRAVENOUS; SUBCUTANEOUS at 18:51

## 2017-01-28 RX ADMIN — PIPERACILLIN AND TAZOBACTAM 25 GRAM(S): 4; .5 INJECTION, POWDER, LYOPHILIZED, FOR SOLUTION INTRAVENOUS at 22:22

## 2017-01-28 RX ADMIN — NYSTATIN CREAM 1 APPLICATION(S): 100000 CREAM TOPICAL at 18:51

## 2017-01-28 RX ADMIN — NYSTATIN CREAM 1 APPLICATION(S): 100000 CREAM TOPICAL at 06:22

## 2017-01-28 RX ADMIN — MIDODRINE HYDROCHLORIDE 10 MILLIGRAM(S): 2.5 TABLET ORAL at 14:02

## 2017-01-28 RX ADMIN — Medication 1 DROP(S): at 06:22

## 2017-01-28 RX ADMIN — SODIUM CHLORIDE 100 MILLILITER(S): 9 INJECTION INTRAMUSCULAR; INTRAVENOUS; SUBCUTANEOUS at 18:57

## 2017-01-28 RX ADMIN — Medication 1 APPLICATION(S): at 18:56

## 2017-01-28 RX ADMIN — CLOPIDOGREL BISULFATE 75 MILLIGRAM(S): 75 TABLET, FILM COATED ORAL at 14:03

## 2017-01-29 LAB
BUN SERPL-MCNC: 43 MG/DL — HIGH (ref 7–23)
CALCIUM SERPL-MCNC: 8.4 MG/DL — SIGNIFICANT CHANGE UP (ref 8.4–10.5)
CHLORIDE SERPL-SCNC: 119 MMOL/L — HIGH (ref 98–107)
CO2 SERPL-SCNC: 16 MMOL/L — LOW (ref 22–31)
CREAT SERPL-MCNC: 1.97 MG/DL — HIGH (ref 0.5–1.3)
GLUCOSE SERPL-MCNC: 71 MG/DL — SIGNIFICANT CHANGE UP (ref 70–99)
HCT VFR BLD CALC: 31.4 % — LOW (ref 34.5–45)
HGB BLD-MCNC: 9.9 G/DL — LOW (ref 11.5–15.5)
MCHC RBC-ENTMCNC: 27.5 PG — SIGNIFICANT CHANGE UP (ref 27–34)
MCHC RBC-ENTMCNC: 31.5 % — LOW (ref 32–36)
MCV RBC AUTO: 87.2 FL — SIGNIFICANT CHANGE UP (ref 80–100)
PLATELET # BLD AUTO: 263 K/UL — SIGNIFICANT CHANGE UP (ref 150–400)
PMV BLD: 9.5 FL — SIGNIFICANT CHANGE UP (ref 7–13)
POTASSIUM SERPL-MCNC: 4.5 MMOL/L — SIGNIFICANT CHANGE UP (ref 3.5–5.3)
POTASSIUM SERPL-SCNC: 4.5 MMOL/L — SIGNIFICANT CHANGE UP (ref 3.5–5.3)
RBC # BLD: 3.6 M/UL — LOW (ref 3.8–5.2)
RBC # FLD: 17.7 % — HIGH (ref 10.3–14.5)
SODIUM SERPL-SCNC: 152 MMOL/L — HIGH (ref 135–145)
WBC # BLD: 4.61 K/UL — SIGNIFICANT CHANGE UP (ref 3.8–10.5)
WBC # FLD AUTO: 4.61 K/UL — SIGNIFICANT CHANGE UP (ref 3.8–10.5)

## 2017-01-29 PROCEDURE — 93306 TTE W/DOPPLER COMPLETE: CPT | Mod: 26

## 2017-01-29 RX ORDER — SODIUM CHLORIDE 9 MG/ML
1000 INJECTION, SOLUTION INTRAVENOUS
Qty: 0 | Refills: 0 | Status: DISCONTINUED | OUTPATIENT
Start: 2017-01-29 | End: 2017-01-30

## 2017-01-29 RX ORDER — SODIUM CHLORIDE 9 MG/ML
1000 INJECTION, SOLUTION INTRAVENOUS
Qty: 0 | Refills: 0 | Status: DISCONTINUED | OUTPATIENT
Start: 2017-01-29 | End: 2017-01-29

## 2017-01-29 RX ADMIN — Medication 1 DROP(S): at 05:30

## 2017-01-29 RX ADMIN — Medication 1 APPLICATION(S): at 18:02

## 2017-01-29 RX ADMIN — MIDODRINE HYDROCHLORIDE 10 MILLIGRAM(S): 2.5 TABLET ORAL at 17:03

## 2017-01-29 RX ADMIN — SODIUM CHLORIDE 75 MILLILITER(S): 9 INJECTION, SOLUTION INTRAVENOUS at 10:42

## 2017-01-29 RX ADMIN — Medication 1 APPLICATION(S): at 05:30

## 2017-01-29 RX ADMIN — PANTOPRAZOLE SODIUM 40 MILLIGRAM(S): 20 TABLET, DELAYED RELEASE ORAL at 05:31

## 2017-01-29 RX ADMIN — CLOPIDOGREL BISULFATE 75 MILLIGRAM(S): 75 TABLET, FILM COATED ORAL at 11:57

## 2017-01-29 RX ADMIN — SODIUM CHLORIDE 75 MILLILITER(S): 9 INJECTION, SOLUTION INTRAVENOUS at 23:23

## 2017-01-29 RX ADMIN — MIDODRINE HYDROCHLORIDE 10 MILLIGRAM(S): 2.5 TABLET ORAL at 05:31

## 2017-01-29 RX ADMIN — Medication 1 TABLET(S): at 11:57

## 2017-01-29 RX ADMIN — Medication 1 DROP(S): at 18:02

## 2017-01-29 RX ADMIN — PIPERACILLIN AND TAZOBACTAM 25 GRAM(S): 4; .5 INJECTION, POWDER, LYOPHILIZED, FOR SOLUTION INTRAVENOUS at 23:30

## 2017-01-29 RX ADMIN — HEPARIN SODIUM 5000 UNIT(S): 5000 INJECTION INTRAVENOUS; SUBCUTANEOUS at 05:30

## 2017-01-29 RX ADMIN — NYSTATIN CREAM 1 APPLICATION(S): 100000 CREAM TOPICAL at 18:02

## 2017-01-29 RX ADMIN — Medication 1 APPLICATION(S): at 22:33

## 2017-01-29 RX ADMIN — HEPARIN SODIUM 5000 UNIT(S): 5000 INJECTION INTRAVENOUS; SUBCUTANEOUS at 17:57

## 2017-01-29 RX ADMIN — Medication 81 MILLIGRAM(S): at 11:57

## 2017-01-29 RX ADMIN — PIPERACILLIN AND TAZOBACTAM 25 GRAM(S): 4; .5 INJECTION, POWDER, LYOPHILIZED, FOR SOLUTION INTRAVENOUS at 10:50

## 2017-01-29 RX ADMIN — NYSTATIN CREAM 1 APPLICATION(S): 100000 CREAM TOPICAL at 05:30

## 2017-01-29 NOTE — PROVIDER CONTACT NOTE (OTHER) - SITUATION
Pt is still refusing meds, clinical impact nurse at bed side to help w hyperthermia blanket still unable to give meds

## 2017-01-30 LAB
BACTERIA BLD CULT: SIGNIFICANT CHANGE UP
BUN SERPL-MCNC: 24 MG/DL — HIGH (ref 7–23)
CALCIUM SERPL-MCNC: 8 MG/DL — LOW (ref 8.4–10.5)
CHLORIDE SERPL-SCNC: 118 MMOL/L — HIGH (ref 98–107)
CO2 SERPL-SCNC: 17 MMOL/L — LOW (ref 22–31)
CREAT SERPL-MCNC: 1.35 MG/DL — HIGH (ref 0.5–1.3)
GLUCOSE SERPL-MCNC: 119 MG/DL — HIGH (ref 70–99)
HCT VFR BLD CALC: 29.8 % — LOW (ref 34.5–45)
HGB BLD-MCNC: 9.5 G/DL — LOW (ref 11.5–15.5)
MCHC RBC-ENTMCNC: 27.8 PG — SIGNIFICANT CHANGE UP (ref 27–34)
MCHC RBC-ENTMCNC: 31.9 % — LOW (ref 32–36)
MCV RBC AUTO: 87.1 FL — SIGNIFICANT CHANGE UP (ref 80–100)
PLATELET # BLD AUTO: 221 K/UL — SIGNIFICANT CHANGE UP (ref 150–400)
PMV BLD: 9.1 FL — SIGNIFICANT CHANGE UP (ref 7–13)
POTASSIUM SERPL-MCNC: 3.9 MMOL/L — SIGNIFICANT CHANGE UP (ref 3.5–5.3)
POTASSIUM SERPL-SCNC: 3.9 MMOL/L — SIGNIFICANT CHANGE UP (ref 3.5–5.3)
RBC # BLD: 3.42 M/UL — LOW (ref 3.8–5.2)
RBC # FLD: 18.4 % — HIGH (ref 10.3–14.5)
SODIUM SERPL-SCNC: 150 MMOL/L — HIGH (ref 135–145)
WBC # BLD: 5.05 K/UL — SIGNIFICANT CHANGE UP (ref 3.8–10.5)
WBC # FLD AUTO: 5.05 K/UL — SIGNIFICANT CHANGE UP (ref 3.8–10.5)

## 2017-01-30 PROCEDURE — 93923 UPR/LXTR ART STDY 3+ LVLS: CPT | Mod: 26

## 2017-01-30 PROCEDURE — 99223 1ST HOSP IP/OBS HIGH 75: CPT

## 2017-01-30 RX ORDER — SODIUM CHLORIDE 9 MG/ML
250 INJECTION, SOLUTION INTRAVENOUS
Qty: 0 | Refills: 0 | Status: DISCONTINUED | OUTPATIENT
Start: 2017-01-30 | End: 2017-01-30

## 2017-01-30 RX ORDER — SODIUM CHLORIDE 9 MG/ML
1000 INJECTION, SOLUTION INTRAVENOUS
Qty: 0 | Refills: 0 | Status: DISCONTINUED | OUTPATIENT
Start: 2017-01-30 | End: 2017-02-07

## 2017-01-30 RX ORDER — SODIUM CHLORIDE 9 MG/ML
250 INJECTION INTRAMUSCULAR; INTRAVENOUS; SUBCUTANEOUS ONCE
Qty: 0 | Refills: 0 | Status: DISCONTINUED | OUTPATIENT
Start: 2017-01-30 | End: 2017-01-30

## 2017-01-30 RX ADMIN — Medication 1 DROP(S): at 17:56

## 2017-01-30 RX ADMIN — Medication 1 APPLICATION(S): at 17:55

## 2017-01-30 RX ADMIN — SODIUM CHLORIDE 50 MILLILITER(S): 9 INJECTION, SOLUTION INTRAVENOUS at 09:25

## 2017-01-30 RX ADMIN — NYSTATIN CREAM 1 APPLICATION(S): 100000 CREAM TOPICAL at 06:45

## 2017-01-30 RX ADMIN — PIPERACILLIN AND TAZOBACTAM 25 GRAM(S): 4; .5 INJECTION, POWDER, LYOPHILIZED, FOR SOLUTION INTRAVENOUS at 23:15

## 2017-01-30 RX ADMIN — Medication 1 APPLICATION(S): at 22:47

## 2017-01-30 RX ADMIN — Medication 1 APPLICATION(S): at 06:45

## 2017-01-30 RX ADMIN — PIPERACILLIN AND TAZOBACTAM 25 GRAM(S): 4; .5 INJECTION, POWDER, LYOPHILIZED, FOR SOLUTION INTRAVENOUS at 12:07

## 2017-01-30 RX ADMIN — HEPARIN SODIUM 5000 UNIT(S): 5000 INJECTION INTRAVENOUS; SUBCUTANEOUS at 17:55

## 2017-01-30 RX ADMIN — SODIUM CHLORIDE 500 MILLILITER(S): 9 INJECTION, SOLUTION INTRAVENOUS at 02:52

## 2017-01-30 RX ADMIN — Medication 1 DROP(S): at 06:45

## 2017-01-30 RX ADMIN — HEPARIN SODIUM 5000 UNIT(S): 5000 INJECTION INTRAVENOUS; SUBCUTANEOUS at 06:43

## 2017-01-30 RX ADMIN — NYSTATIN CREAM 1 APPLICATION(S): 100000 CREAM TOPICAL at 17:56

## 2017-01-30 NOTE — DIETITIAN INITIAL EVALUATION ADULT. - NS AS NUTRI INTERV COLLABORAT3
1)Continue PO diet... consistency, as per SLP recommendations (if medically appropriate) & swallow evaluation findings.  2)If PO diet deemed appropriate, suggest addition of Ensure Enlive 8oz PO 2x daily to help meet nutrient needs  3)Add Multivitamin for micronutrient coverage  4)Obtain re-weight, as well as daily weights.

## 2017-01-30 NOTE — SWALLOW BEDSIDE ASSESSMENT ADULT - COMMENTS
The pt. was received in bed awake, but eyes remained closed throughout the evaluation. The pt. used a head nod to answer simple yes/no questions. The pt. was positioned upright and presented with a teaspoon trial of applesauce to her lips resulting in the pt. pursing her lips shut to the puree bolus and refused all PO trials at this time. Reviewed results with the pt.'s nurse who expressed that the pt. has not been eating. Will reattempt as schedule permits.

## 2017-01-30 NOTE — DIETITIAN INITIAL EVALUATION ADULT. - NUTRITION INTERVENTION
Meals and Snack/Collaboration and Referral of Nutrition Care Medical Food Supplements/Collaboration and Referral of Nutrition Care/Meals and Snack/Vitamin

## 2017-01-30 NOTE — DIETITIAN INITIAL EVALUATION ADULT. - ADHERENCE
Regular consistency, thin liquids, No Added Salt + TwoCalHN 4oz PO 2x daily + Proform 30mL PO 3x daily.

## 2017-01-30 NOTE — DIETITIAN INITIAL EVALUATION ADULT. - ETIOLOGY
Advanced age, AMS perhaps contributing. Acute illness.  Chewing/swallowing difficulty perhaps contributory.

## 2017-01-30 NOTE — DIETITIAN INITIAL EVALUATION ADULT. - PROBLEM SELECTOR PLAN 1
-pt has leukocytosis, hypothermia, tachycardia in setting of UTI with YESSI, pt meets sepsis criteria  -most likely source is UTI, but must complete sepsis work up with CXR given pt cannot provide hx and could not obtain complete information from nurse manager/charts from nursing home  -will cover broadly for now until CXR is done with vanc (vanc by level given severe YESSI) /zosyna; if negative for PNA, will deescalate to ceftriaxone; if positive for PNA will add azithromycin  -f/u blood cx, urine cx; will obtain urine legionella, RVP if positive for PNA  -gentle IVF for insensible losses

## 2017-01-30 NOTE — DIETITIAN INITIAL EVALUATION ADULT. - PROBLEM SELECTOR PLAN 3
-6.7 to 5.1 s/p bicarb, insulin + dextrose  -will monitor K q12h  -will place pt on telemetry  -will give kayexalate 15 mg X 1

## 2017-01-30 NOTE — DIETITIAN INITIAL EVALUATION ADULT. - OTHER INFO
Met with Pt.  Alert, although nonverbal. As per chart & nursing, Pt. refusing to eat or take medications via PO.  Swallow evaluation ordered/pending.  No noted/reported nausea/vomiting/diarrhea/constipation at this time and NKFA.  Weight Hx unclear... current documented weight (66.8lbs) questionably accurate as Pt. does not physically appear to be this weight.  Attempted to reweigh via bedscale, although bed likely not tared.

## 2017-01-30 NOTE — SWALLOW BEDSIDE ASSESSMENT ADULT - SLP PERTINENT HISTORY OF CURRENT PROBLEM
89 yo female PMH of HTN, CAD, CHF, PVD, OA, DJD referred from [facility] for acute renal failure. Pt noted to have Cr of 5, new, suspected sepsis. Pt nonverbal, unable to provide further hx.

## 2017-01-31 LAB
-  AMIKACIN: SIGNIFICANT CHANGE UP
-  AMIKACIN: SIGNIFICANT CHANGE UP
-  AMPICILLIN/SULBACTAM: SIGNIFICANT CHANGE UP
-  AMPICILLIN: SIGNIFICANT CHANGE UP
-  AMPICILLIN: SIGNIFICANT CHANGE UP
-  AZTREONAM: SIGNIFICANT CHANGE UP
-  AZTREONAM: SIGNIFICANT CHANGE UP
-  CEFAZOLIN: SIGNIFICANT CHANGE UP
-  CEFEPIME: SIGNIFICANT CHANGE UP
-  CEFEPIME: SIGNIFICANT CHANGE UP
-  CEFOXITIN: SIGNIFICANT CHANGE UP
-  CEFTAZIDIME: SIGNIFICANT CHANGE UP
-  CEFTAZIDIME: SIGNIFICANT CHANGE UP
-  CEFTRIAXONE: SIGNIFICANT CHANGE UP
-  CIPROFLOXACIN: SIGNIFICANT CHANGE UP
-  CIPROFLOXACIN: SIGNIFICANT CHANGE UP
-  ERTAPENEM: SIGNIFICANT CHANGE UP
-  GENTAMICIN: SIGNIFICANT CHANGE UP
-  GENTAMICIN: SIGNIFICANT CHANGE UP
-  IMIPENEM: SIGNIFICANT CHANGE UP
-  IMIPENEM: SIGNIFICANT CHANGE UP
-  LEVOFLOXACIN: SIGNIFICANT CHANGE UP
-  LEVOFLOXACIN: SIGNIFICANT CHANGE UP
-  MEROPENEM: SIGNIFICANT CHANGE UP
-  MEROPENEM: SIGNIFICANT CHANGE UP
-  NITROFURANTOIN: SIGNIFICANT CHANGE UP
-  NITROFURANTOIN: SIGNIFICANT CHANGE UP
-  PIPERACILLIN/TAZOBACTAM: SIGNIFICANT CHANGE UP
-  PIPERACILLIN/TAZOBACTAM: SIGNIFICANT CHANGE UP
-  TETRACYCLINE: SIGNIFICANT CHANGE UP
-  TIGECYCLINE: SIGNIFICANT CHANGE UP
-  TOBRAMYCIN: SIGNIFICANT CHANGE UP
-  TOBRAMYCIN: SIGNIFICANT CHANGE UP
-  TRIMETHOPRIM/SULFAMETHOXAZOLE: SIGNIFICANT CHANGE UP
-  VANCOMYCIN: SIGNIFICANT CHANGE UP
BACTERIA BLD CULT: SIGNIFICANT CHANGE UP
BACTERIA UR CULT: SIGNIFICANT CHANGE UP
BUN SERPL-MCNC: 13 MG/DL — SIGNIFICANT CHANGE UP (ref 7–23)
CALCIUM SERPL-MCNC: 8 MG/DL — LOW (ref 8.4–10.5)
CHLORIDE SERPL-SCNC: 116 MMOL/L — HIGH (ref 98–107)
CO2 SERPL-SCNC: 18 MMOL/L — LOW (ref 22–31)
CREAT SERPL-MCNC: 1.02 MG/DL — SIGNIFICANT CHANGE UP (ref 0.5–1.3)
GLUCOSE SERPL-MCNC: 96 MG/DL — SIGNIFICANT CHANGE UP (ref 70–99)
METHOD TYPE: SIGNIFICANT CHANGE UP
ORGANISM # SPEC MICROSCOPIC CNT: SIGNIFICANT CHANGE UP
POTASSIUM SERPL-MCNC: 3.6 MMOL/L — SIGNIFICANT CHANGE UP (ref 3.5–5.3)
POTASSIUM SERPL-SCNC: 3.6 MMOL/L — SIGNIFICANT CHANGE UP (ref 3.5–5.3)
SODIUM SERPL-SCNC: 146 MMOL/L — HIGH (ref 135–145)

## 2017-01-31 RX ADMIN — Medication 1 TABLET(S): at 11:56

## 2017-01-31 RX ADMIN — Medication 1 APPLICATION(S): at 21:33

## 2017-01-31 RX ADMIN — CLOPIDOGREL BISULFATE 75 MILLIGRAM(S): 75 TABLET, FILM COATED ORAL at 11:56

## 2017-01-31 RX ADMIN — PIPERACILLIN AND TAZOBACTAM 25 GRAM(S): 4; .5 INJECTION, POWDER, LYOPHILIZED, FOR SOLUTION INTRAVENOUS at 23:46

## 2017-01-31 RX ADMIN — Medication 1 APPLICATION(S): at 06:23

## 2017-01-31 RX ADMIN — Medication 1 DROP(S): at 06:23

## 2017-01-31 RX ADMIN — SODIUM CHLORIDE 50 MILLILITER(S): 9 INJECTION, SOLUTION INTRAVENOUS at 06:21

## 2017-01-31 RX ADMIN — Medication 1 APPLICATION(S): at 18:11

## 2017-01-31 RX ADMIN — HEPARIN SODIUM 5000 UNIT(S): 5000 INJECTION INTRAVENOUS; SUBCUTANEOUS at 18:09

## 2017-01-31 RX ADMIN — MIDODRINE HYDROCHLORIDE 10 MILLIGRAM(S): 2.5 TABLET ORAL at 13:26

## 2017-01-31 RX ADMIN — Medication 81 MILLIGRAM(S): at 11:56

## 2017-01-31 RX ADMIN — PIPERACILLIN AND TAZOBACTAM 25 GRAM(S): 4; .5 INJECTION, POWDER, LYOPHILIZED, FOR SOLUTION INTRAVENOUS at 11:54

## 2017-01-31 RX ADMIN — NYSTATIN CREAM 1 APPLICATION(S): 100000 CREAM TOPICAL at 18:09

## 2017-01-31 RX ADMIN — HEPARIN SODIUM 5000 UNIT(S): 5000 INJECTION INTRAVENOUS; SUBCUTANEOUS at 06:22

## 2017-01-31 RX ADMIN — Medication 1 DROP(S): at 18:08

## 2017-01-31 RX ADMIN — NYSTATIN CREAM 1 APPLICATION(S): 100000 CREAM TOPICAL at 06:22

## 2017-01-31 NOTE — PROVIDER CONTACT NOTE (OTHER) - SITUATION
Attempted to give pt Midodrine mcrushed through syringe, pt spits it up, do not want pt to aspirate. Current /68.

## 2017-02-01 LAB
BUN SERPL-MCNC: 8 MG/DL — SIGNIFICANT CHANGE UP (ref 7–23)
CALCIUM SERPL-MCNC: 7.7 MG/DL — LOW (ref 8.4–10.5)
CHLORIDE SERPL-SCNC: 110 MMOL/L — HIGH (ref 98–107)
CO2 SERPL-SCNC: 18 MMOL/L — LOW (ref 22–31)
CREAT SERPL-MCNC: 0.93 MG/DL — SIGNIFICANT CHANGE UP (ref 0.5–1.3)
GLUCOSE SERPL-MCNC: 95 MG/DL — SIGNIFICANT CHANGE UP (ref 70–99)
HCT VFR BLD CALC: 29.9 % — LOW (ref 34.5–45)
HGB BLD-MCNC: 9.5 G/DL — LOW (ref 11.5–15.5)
MCHC RBC-ENTMCNC: 27.1 PG — SIGNIFICANT CHANGE UP (ref 27–34)
MCHC RBC-ENTMCNC: 31.8 % — LOW (ref 32–36)
MCV RBC AUTO: 85.2 FL — SIGNIFICANT CHANGE UP (ref 80–100)
PLATELET # BLD AUTO: 198 K/UL — SIGNIFICANT CHANGE UP (ref 150–400)
PMV BLD: 9.3 FL — SIGNIFICANT CHANGE UP (ref 7–13)
POTASSIUM SERPL-MCNC: 3.3 MMOL/L — LOW (ref 3.5–5.3)
POTASSIUM SERPL-SCNC: 3.3 MMOL/L — LOW (ref 3.5–5.3)
RBC # BLD: 3.51 M/UL — LOW (ref 3.8–5.2)
RBC # FLD: 18 % — HIGH (ref 10.3–14.5)
SODIUM SERPL-SCNC: 142 MMOL/L — SIGNIFICANT CHANGE UP (ref 135–145)
WBC # BLD: 6.6 K/UL — SIGNIFICANT CHANGE UP (ref 3.8–10.5)
WBC # FLD AUTO: 6.6 K/UL — SIGNIFICANT CHANGE UP (ref 3.8–10.5)

## 2017-02-01 RX ORDER — SODIUM CHLORIDE 9 MG/ML
250 INJECTION INTRAMUSCULAR; INTRAVENOUS; SUBCUTANEOUS ONCE
Qty: 0 | Refills: 0 | Status: DISCONTINUED | OUTPATIENT
Start: 2017-02-01 | End: 2017-02-01

## 2017-02-01 RX ORDER — POTASSIUM CHLORIDE 20 MEQ
10 PACKET (EA) ORAL
Qty: 0 | Refills: 0 | Status: COMPLETED | OUTPATIENT
Start: 2017-02-01 | End: 2017-02-01

## 2017-02-01 RX ADMIN — HEPARIN SODIUM 5000 UNIT(S): 5000 INJECTION INTRAVENOUS; SUBCUTANEOUS at 05:55

## 2017-02-01 RX ADMIN — Medication 1 APPLICATION(S): at 23:23

## 2017-02-01 RX ADMIN — PIPERACILLIN AND TAZOBACTAM 25 GRAM(S): 4; .5 INJECTION, POWDER, LYOPHILIZED, FOR SOLUTION INTRAVENOUS at 11:59

## 2017-02-01 RX ADMIN — Medication 100 MILLIEQUIVALENT(S): at 11:07

## 2017-02-01 RX ADMIN — Medication 100 MILLIEQUIVALENT(S): at 10:15

## 2017-02-01 RX ADMIN — SODIUM CHLORIDE 50 MILLILITER(S): 9 INJECTION, SOLUTION INTRAVENOUS at 23:09

## 2017-02-01 RX ADMIN — HEPARIN SODIUM 5000 UNIT(S): 5000 INJECTION INTRAVENOUS; SUBCUTANEOUS at 18:49

## 2017-02-01 RX ADMIN — Medication 100 MILLIEQUIVALENT(S): at 12:00

## 2017-02-01 RX ADMIN — Medication 1 DROP(S): at 05:54

## 2017-02-01 RX ADMIN — NYSTATIN CREAM 1 APPLICATION(S): 100000 CREAM TOPICAL at 05:59

## 2017-02-01 RX ADMIN — Medication 1 APPLICATION(S): at 05:59

## 2017-02-01 RX ADMIN — Medication 1 DROP(S): at 18:51

## 2017-02-01 RX ADMIN — SODIUM CHLORIDE 50 MILLILITER(S): 9 INJECTION, SOLUTION INTRAVENOUS at 02:22

## 2017-02-01 RX ADMIN — PIPERACILLIN AND TAZOBACTAM 25 GRAM(S): 4; .5 INJECTION, POWDER, LYOPHILIZED, FOR SOLUTION INTRAVENOUS at 23:09

## 2017-02-01 RX ADMIN — Medication 1 APPLICATION(S): at 18:51

## 2017-02-01 RX ADMIN — NYSTATIN CREAM 1 APPLICATION(S): 100000 CREAM TOPICAL at 18:49

## 2017-02-01 NOTE — PROVIDER CONTACT NOTE (OTHER) - SITUATION
PT has hx of chf, no order for daily weight. Pt also has D5 going @50ml/hr, arms swelling bilaterally

## 2017-02-01 NOTE — PROVIDER CONTACT NOTE (OTHER) - SITUATION
Ordered 250 cc bolus for Pt. Do not feel comfortable giving NS bolus. Pt has high sodium levels. Pt also is showing signs of fluid overload/third spacing

## 2017-02-01 NOTE — PROVIDER CONTACT NOTE (OTHER) - SITUATION
Pt is refusing PO meds, tried with apple sauce, tried crushing with water in syringe. Pt spits meds out. Pt is refusing midodrine. Recent BP 98/56

## 2017-02-02 LAB
APPEARANCE UR: SIGNIFICANT CHANGE UP
BILIRUB UR-MCNC: NEGATIVE — SIGNIFICANT CHANGE UP
BLOOD UR QL VISUAL: HIGH
BUN SERPL-MCNC: 5 MG/DL — LOW (ref 7–23)
CALCIUM SERPL-MCNC: 8.1 MG/DL — LOW (ref 8.4–10.5)
CHLORIDE SERPL-SCNC: 107 MMOL/L — SIGNIFICANT CHANGE UP (ref 98–107)
CO2 SERPL-SCNC: 20 MMOL/L — LOW (ref 22–31)
COLOR SPEC: SIGNIFICANT CHANGE UP
CREAT SERPL-MCNC: 0.91 MG/DL — SIGNIFICANT CHANGE UP (ref 0.5–1.3)
GLUCOSE SERPL-MCNC: 97 MG/DL — SIGNIFICANT CHANGE UP (ref 70–99)
GLUCOSE UR-MCNC: NEGATIVE — SIGNIFICANT CHANGE UP
HCT VFR BLD CALC: 33.1 % — LOW (ref 34.5–45)
HGB BLD-MCNC: 10.5 G/DL — LOW (ref 11.5–15.5)
HYALINE CASTS # UR AUTO: SIGNIFICANT CHANGE UP (ref 0–?)
KETONES UR-MCNC: NEGATIVE — SIGNIFICANT CHANGE UP
LEUKOCYTE ESTERASE UR-ACNC: HIGH
MCHC RBC-ENTMCNC: 27.3 PG — SIGNIFICANT CHANGE UP (ref 27–34)
MCHC RBC-ENTMCNC: 31.7 % — LOW (ref 32–36)
MCV RBC AUTO: 86 FL — SIGNIFICANT CHANGE UP (ref 80–100)
NITRITE UR-MCNC: NEGATIVE — SIGNIFICANT CHANGE UP
NON-SQ EPI CELLS # UR AUTO: <1 — SIGNIFICANT CHANGE UP
PH UR: 6.5 — SIGNIFICANT CHANGE UP (ref 4.6–8)
PLATELET # BLD AUTO: 160 K/UL — SIGNIFICANT CHANGE UP (ref 150–400)
PMV BLD: 9.2 FL — SIGNIFICANT CHANGE UP (ref 7–13)
POTASSIUM SERPL-MCNC: 3.6 MMOL/L — SIGNIFICANT CHANGE UP (ref 3.5–5.3)
POTASSIUM SERPL-SCNC: 3.6 MMOL/L — SIGNIFICANT CHANGE UP (ref 3.5–5.3)
PROT UR-MCNC: 10 — SIGNIFICANT CHANGE UP
RBC # BLD: 3.85 M/UL — SIGNIFICANT CHANGE UP (ref 3.8–5.2)
RBC # FLD: 18.1 % — HIGH (ref 10.3–14.5)
RBC CASTS # UR COMP ASSIST: SIGNIFICANT CHANGE UP (ref 0–?)
SODIUM SERPL-SCNC: 138 MMOL/L — SIGNIFICANT CHANGE UP (ref 135–145)
SP GR SPEC: 1.01 — SIGNIFICANT CHANGE UP (ref 1–1.03)
SQUAMOUS # UR AUTO: SIGNIFICANT CHANGE UP
UROBILINOGEN FLD QL: NORMAL E.U. — SIGNIFICANT CHANGE UP (ref 0.1–0.2)
WBC # BLD: 5.11 K/UL — SIGNIFICANT CHANGE UP (ref 3.8–10.5)
WBC # FLD AUTO: 5.11 K/UL — SIGNIFICANT CHANGE UP (ref 3.8–10.5)
WBC UR QL: >50 — HIGH (ref 0–?)

## 2017-02-02 PROCEDURE — 99222 1ST HOSP IP/OBS MODERATE 55: CPT | Mod: GC

## 2017-02-02 PROCEDURE — 93010 ELECTROCARDIOGRAM REPORT: CPT

## 2017-02-02 PROCEDURE — 71010: CPT | Mod: 26

## 2017-02-02 RX ORDER — PIPERACILLIN AND TAZOBACTAM 4; .5 G/20ML; G/20ML
3.38 INJECTION, POWDER, LYOPHILIZED, FOR SOLUTION INTRAVENOUS EVERY 8 HOURS
Qty: 0 | Refills: 0 | Status: DISCONTINUED | OUTPATIENT
Start: 2017-02-02 | End: 2017-02-07

## 2017-02-02 RX ORDER — VANCOMYCIN HCL 1 G
1000 VIAL (EA) INTRAVENOUS ONCE
Qty: 0 | Refills: 0 | Status: COMPLETED | OUTPATIENT
Start: 2017-02-02 | End: 2017-02-02

## 2017-02-02 RX ADMIN — Medication 1 APPLICATION(S): at 18:11

## 2017-02-02 RX ADMIN — Medication 250 MILLIGRAM(S): at 10:59

## 2017-02-02 RX ADMIN — PIPERACILLIN AND TAZOBACTAM 25 GRAM(S): 4; .5 INJECTION, POWDER, LYOPHILIZED, FOR SOLUTION INTRAVENOUS at 21:58

## 2017-02-02 RX ADMIN — HEPARIN SODIUM 5000 UNIT(S): 5000 INJECTION INTRAVENOUS; SUBCUTANEOUS at 06:09

## 2017-02-02 RX ADMIN — PIPERACILLIN AND TAZOBACTAM 25 GRAM(S): 4; .5 INJECTION, POWDER, LYOPHILIZED, FOR SOLUTION INTRAVENOUS at 12:34

## 2017-02-02 RX ADMIN — Medication 1 APPLICATION(S): at 22:01

## 2017-02-02 RX ADMIN — Medication 1 APPLICATION(S): at 06:08

## 2017-02-02 RX ADMIN — Medication 1 DROP(S): at 18:10

## 2017-02-02 RX ADMIN — NYSTATIN CREAM 1 APPLICATION(S): 100000 CREAM TOPICAL at 06:08

## 2017-02-02 RX ADMIN — NYSTATIN CREAM 1 APPLICATION(S): 100000 CREAM TOPICAL at 18:11

## 2017-02-02 RX ADMIN — Medication 1 DROP(S): at 06:08

## 2017-02-02 NOTE — PROVIDER CONTACT NOTE (OTHER) - ACTION/TREATMENT ORDERED:
Provider aware
Provider aware, pt pending speech and swallow
Provider aware, waiting on orders
Provider aware, will continue to monitor
Provider aware, will continue to monitor
Provider aware. Continue to monitor BP
Provider aware
Provider aware, get rectal temp
Provider aware, waiting on orders, will continue to monitor
Provider aware, waiting on orders
hold midodrine
Ordered Hypothermia blanket to be applied. Continue to monitor.
provided hyperthemia blanket
provider aware, place bear hugger

## 2017-02-02 NOTE — PROVIDER CONTACT NOTE (OTHER) - RECOMMENDATIONS
Put in order for daily weight, possibly adjust fluids
Do not give bolus.
Heating packs applied and a second blanket. Warming blanket to be applied ASAP.
apply bear hugger
provided heating packs

## 2017-02-03 LAB
SPECIMEN SOURCE: SIGNIFICANT CHANGE UP
SPECIMEN SOURCE: SIGNIFICANT CHANGE UP
T4 AB SER-ACNC: 5.29 UG/DL — SIGNIFICANT CHANGE UP (ref 5.1–13)
TSH SERPL-MCNC: 7.59 UIU/ML — HIGH (ref 0.27–4.2)

## 2017-02-03 PROCEDURE — 99232 SBSQ HOSP IP/OBS MODERATE 35: CPT

## 2017-02-03 PROCEDURE — 74176 CT ABD & PELVIS W/O CONTRAST: CPT | Mod: 26

## 2017-02-03 RX ORDER — MIDODRINE HYDROCHLORIDE 2.5 MG/1
5 TABLET ORAL EVERY 8 HOURS
Qty: 0 | Refills: 0 | Status: DISCONTINUED | OUTPATIENT
Start: 2017-02-03 | End: 2017-02-07

## 2017-02-03 RX ADMIN — NYSTATIN CREAM 1 APPLICATION(S): 100000 CREAM TOPICAL at 06:08

## 2017-02-03 RX ADMIN — NYSTATIN CREAM 1 APPLICATION(S): 100000 CREAM TOPICAL at 18:10

## 2017-02-03 RX ADMIN — PIPERACILLIN AND TAZOBACTAM 25 GRAM(S): 4; .5 INJECTION, POWDER, LYOPHILIZED, FOR SOLUTION INTRAVENOUS at 12:55

## 2017-02-03 RX ADMIN — Medication 1 TABLET(S): at 11:27

## 2017-02-03 RX ADMIN — Medication 1 DROP(S): at 18:11

## 2017-02-03 RX ADMIN — Medication 1 DROP(S): at 06:08

## 2017-02-03 RX ADMIN — Medication 1 APPLICATION(S): at 18:11

## 2017-02-03 RX ADMIN — CLOPIDOGREL BISULFATE 75 MILLIGRAM(S): 75 TABLET, FILM COATED ORAL at 11:27

## 2017-02-03 RX ADMIN — Medication 1 APPLICATION(S): at 06:07

## 2017-02-03 RX ADMIN — PIPERACILLIN AND TAZOBACTAM 25 GRAM(S): 4; .5 INJECTION, POWDER, LYOPHILIZED, FOR SOLUTION INTRAVENOUS at 22:23

## 2017-02-03 RX ADMIN — Medication 1 APPLICATION(S): at 22:23

## 2017-02-03 RX ADMIN — MIRTAZAPINE 15 MILLIGRAM(S): 45 TABLET, ORALLY DISINTEGRATING ORAL at 22:23

## 2017-02-03 RX ADMIN — Medication 81 MILLIGRAM(S): at 11:27

## 2017-02-03 RX ADMIN — HEPARIN SODIUM 5000 UNIT(S): 5000 INJECTION INTRAVENOUS; SUBCUTANEOUS at 18:11

## 2017-02-03 RX ADMIN — PIPERACILLIN AND TAZOBACTAM 25 GRAM(S): 4; .5 INJECTION, POWDER, LYOPHILIZED, FOR SOLUTION INTRAVENOUS at 06:08

## 2017-02-03 NOTE — SWALLOW BEDSIDE ASSESSMENT ADULT - SLP PERTINENT HISTORY OF CURRENT PROBLEM
89 yo female PMH of HTN, CAD, CHF, PVD, OA, DJD referred from [facility] for acute renal failure. Pt noted to have Cr of 5, new, suspected sepsis. Pt nonverbal, unable to provide further hx. Findings suggesting small bilateral pleural effusions with likely associated passive atelectasis. Atelectasis of other cause and/or pneumonia in the lower lungs is not excluded.

## 2017-02-03 NOTE — SWALLOW BEDSIDE ASSESSMENT ADULT - COMMENTS
The pt. is familiar to this department from previous bedside evaluation attempted on 1/30 resulting in the pt. refusing all PO trials presented. According to nursing report, the pt. has not been consuming her meals. Medications are provided via syringe resulting in the pt. inconsistently swallowing. The pt. was received in bed awake with her hands in a contracted state and remained nonverbal throughout the evaluation. The pt. did not follow any low level commands. The pt. was positioned upright and presented with trials of puree to her lips resulting in the pt. pursing her lips shut, turning her head away from the teaspoon presented and raising her arm to push away the spoon. Unable to complete bedside swallow evaluation secondary to pt.'s refusal. Recommend dietary consult regarding reported decreased PO intake. Results and recommendations reviewed with pt.'s nurse, Glenna.

## 2017-02-04 LAB
BACTERIA UR CULT: SIGNIFICANT CHANGE UP
BASOPHILS # BLD AUTO: 0.02 K/UL — SIGNIFICANT CHANGE UP (ref 0–0.2)
BASOPHILS NFR BLD AUTO: 0.4 % — SIGNIFICANT CHANGE UP (ref 0–2)
BUN SERPL-MCNC: 3 MG/DL — LOW (ref 7–23)
BUN SERPL-MCNC: 4 MG/DL — LOW (ref 7–23)
CALCIUM SERPL-MCNC: 7.7 MG/DL — LOW (ref 8.4–10.5)
CALCIUM SERPL-MCNC: 7.9 MG/DL — LOW (ref 8.4–10.5)
CHLORIDE SERPL-SCNC: 102 MMOL/L — SIGNIFICANT CHANGE UP (ref 98–107)
CHLORIDE SERPL-SCNC: 105 MMOL/L — SIGNIFICANT CHANGE UP (ref 98–107)
CO2 SERPL-SCNC: 22 MMOL/L — SIGNIFICANT CHANGE UP (ref 22–31)
CO2 SERPL-SCNC: 22 MMOL/L — SIGNIFICANT CHANGE UP (ref 22–31)
CREAT SERPL-MCNC: 0.7 MG/DL — SIGNIFICANT CHANGE UP (ref 0.5–1.3)
CREAT SERPL-MCNC: 0.71 MG/DL — SIGNIFICANT CHANGE UP (ref 0.5–1.3)
EOSINOPHIL # BLD AUTO: 0.26 K/UL — SIGNIFICANT CHANGE UP (ref 0–0.5)
EOSINOPHIL NFR BLD AUTO: 4.7 % — SIGNIFICANT CHANGE UP (ref 0–6)
GLUCOSE SERPL-MCNC: 102 MG/DL — HIGH (ref 70–99)
GLUCOSE SERPL-MCNC: 87 MG/DL — SIGNIFICANT CHANGE UP (ref 70–99)
HCT VFR BLD CALC: 32.3 % — LOW (ref 34.5–45)
HGB BLD-MCNC: 10.5 G/DL — LOW (ref 11.5–15.5)
IMM GRANULOCYTES NFR BLD AUTO: 0.5 % — SIGNIFICANT CHANGE UP (ref 0–1.5)
LYMPHOCYTES # BLD AUTO: 1.83 K/UL — SIGNIFICANT CHANGE UP (ref 1–3.3)
LYMPHOCYTES # BLD AUTO: 33.2 % — SIGNIFICANT CHANGE UP (ref 13–44)
MAGNESIUM SERPL-MCNC: 1.1 MG/DL — LOW (ref 1.6–2.6)
MCHC RBC-ENTMCNC: 27.6 PG — SIGNIFICANT CHANGE UP (ref 27–34)
MCHC RBC-ENTMCNC: 32.5 % — SIGNIFICANT CHANGE UP (ref 32–36)
MCV RBC AUTO: 84.8 FL — SIGNIFICANT CHANGE UP (ref 80–100)
MONOCYTES # BLD AUTO: 0.5 K/UL — SIGNIFICANT CHANGE UP (ref 0–0.9)
MONOCYTES NFR BLD AUTO: 9.1 % — SIGNIFICANT CHANGE UP (ref 2–14)
NEUTROPHILS # BLD AUTO: 2.88 K/UL — SIGNIFICANT CHANGE UP (ref 1.8–7.4)
NEUTROPHILS NFR BLD AUTO: 52.1 % — SIGNIFICANT CHANGE UP (ref 43–77)
PHOSPHATE SERPL-MCNC: 2.2 MG/DL — LOW (ref 2.5–4.5)
PLATELET # BLD AUTO: 185 K/UL — SIGNIFICANT CHANGE UP (ref 150–400)
PMV BLD: 9.8 FL — SIGNIFICANT CHANGE UP (ref 7–13)
POTASSIUM SERPL-MCNC: 2.9 MMOL/L — CRITICAL LOW (ref 3.5–5.3)
POTASSIUM SERPL-MCNC: 3.1 MMOL/L — LOW (ref 3.5–5.3)
POTASSIUM SERPL-SCNC: 2.9 MMOL/L — CRITICAL LOW (ref 3.5–5.3)
POTASSIUM SERPL-SCNC: 3.1 MMOL/L — LOW (ref 3.5–5.3)
RBC # BLD: 3.81 M/UL — SIGNIFICANT CHANGE UP (ref 3.8–5.2)
RBC # FLD: 17.9 % — HIGH (ref 10.3–14.5)
SODIUM SERPL-SCNC: 138 MMOL/L — SIGNIFICANT CHANGE UP (ref 135–145)
SODIUM SERPL-SCNC: 140 MMOL/L — SIGNIFICANT CHANGE UP (ref 135–145)
SPECIMEN SOURCE: SIGNIFICANT CHANGE UP
WBC # BLD: 5.52 K/UL — SIGNIFICANT CHANGE UP (ref 3.8–10.5)
WBC # FLD AUTO: 5.52 K/UL — SIGNIFICANT CHANGE UP (ref 3.8–10.5)

## 2017-02-04 RX ORDER — POTASSIUM CHLORIDE 20 MEQ
10 PACKET (EA) ORAL ONCE
Qty: 0 | Refills: 0 | Status: COMPLETED | OUTPATIENT
Start: 2017-02-04 | End: 2017-02-04

## 2017-02-04 RX ORDER — MAGNESIUM SULFATE 500 MG/ML
2 VIAL (ML) INJECTION ONCE
Qty: 0 | Refills: 0 | Status: COMPLETED | OUTPATIENT
Start: 2017-02-04 | End: 2017-02-04

## 2017-02-04 RX ADMIN — Medication 50 GRAM(S): at 20:08

## 2017-02-04 RX ADMIN — CLOPIDOGREL BISULFATE 75 MILLIGRAM(S): 75 TABLET, FILM COATED ORAL at 13:00

## 2017-02-04 RX ADMIN — Medication 1 APPLICATION(S): at 18:03

## 2017-02-04 RX ADMIN — Medication 1 APPLICATION(S): at 22:17

## 2017-02-04 RX ADMIN — PIPERACILLIN AND TAZOBACTAM 25 GRAM(S): 4; .5 INJECTION, POWDER, LYOPHILIZED, FOR SOLUTION INTRAVENOUS at 22:52

## 2017-02-04 RX ADMIN — NYSTATIN CREAM 1 APPLICATION(S): 100000 CREAM TOPICAL at 05:45

## 2017-02-04 RX ADMIN — HEPARIN SODIUM 5000 UNIT(S): 5000 INJECTION INTRAVENOUS; SUBCUTANEOUS at 05:45

## 2017-02-04 RX ADMIN — MIRTAZAPINE 15 MILLIGRAM(S): 45 TABLET, ORALLY DISINTEGRATING ORAL at 22:16

## 2017-02-04 RX ADMIN — Medication 1 DROP(S): at 18:05

## 2017-02-04 RX ADMIN — Medication 1 APPLICATION(S): at 05:45

## 2017-02-04 RX ADMIN — Medication 81 MILLIGRAM(S): at 13:01

## 2017-02-04 RX ADMIN — Medication 100 MILLIEQUIVALENT(S): at 10:01

## 2017-02-04 RX ADMIN — NYSTATIN CREAM 1 APPLICATION(S): 100000 CREAM TOPICAL at 18:04

## 2017-02-04 RX ADMIN — HEPARIN SODIUM 5000 UNIT(S): 5000 INJECTION INTRAVENOUS; SUBCUTANEOUS at 18:03

## 2017-02-04 RX ADMIN — Medication 1 DROP(S): at 05:46

## 2017-02-04 RX ADMIN — Medication 100 MILLIEQUIVALENT(S): at 22:20

## 2017-02-04 RX ADMIN — PIPERACILLIN AND TAZOBACTAM 25 GRAM(S): 4; .5 INJECTION, POWDER, LYOPHILIZED, FOR SOLUTION INTRAVENOUS at 05:46

## 2017-02-04 RX ADMIN — Medication 1 TABLET(S): at 13:00

## 2017-02-04 RX ADMIN — SODIUM CHLORIDE 50 MILLILITER(S): 9 INJECTION, SOLUTION INTRAVENOUS at 18:04

## 2017-02-04 RX ADMIN — PIPERACILLIN AND TAZOBACTAM 25 GRAM(S): 4; .5 INJECTION, POWDER, LYOPHILIZED, FOR SOLUTION INTRAVENOUS at 13:39

## 2017-02-04 RX ADMIN — Medication 1 TABLET(S): at 13:01

## 2017-02-05 LAB
BASOPHILS # BLD AUTO: 0.01 K/UL — SIGNIFICANT CHANGE UP (ref 0–0.2)
BASOPHILS NFR BLD AUTO: 0.2 % — SIGNIFICANT CHANGE UP (ref 0–2)
BUN SERPL-MCNC: 2 MG/DL — LOW (ref 7–23)
CALCIUM SERPL-MCNC: 8.1 MG/DL — LOW (ref 8.4–10.5)
CHLORIDE SERPL-SCNC: 100 MMOL/L — SIGNIFICANT CHANGE UP (ref 98–107)
CO2 SERPL-SCNC: 26 MMOL/L — SIGNIFICANT CHANGE UP (ref 22–31)
CREAT SERPL-MCNC: 0.78 MG/DL — SIGNIFICANT CHANGE UP (ref 0.5–1.3)
EOSINOPHIL # BLD AUTO: 0.17 K/UL — SIGNIFICANT CHANGE UP (ref 0–0.5)
EOSINOPHIL NFR BLD AUTO: 2.9 % — SIGNIFICANT CHANGE UP (ref 0–6)
GLUCOSE SERPL-MCNC: 111 MG/DL — HIGH (ref 70–99)
HCT VFR BLD CALC: 31.8 % — LOW (ref 34.5–45)
HGB BLD-MCNC: 10.2 G/DL — LOW (ref 11.5–15.5)
IMM GRANULOCYTES NFR BLD AUTO: 0.2 % — SIGNIFICANT CHANGE UP (ref 0–1.5)
LYMPHOCYTES # BLD AUTO: 1.76 K/UL — SIGNIFICANT CHANGE UP (ref 1–3.3)
LYMPHOCYTES # BLD AUTO: 29.8 % — SIGNIFICANT CHANGE UP (ref 13–44)
MAGNESIUM SERPL-MCNC: 1.6 MG/DL — SIGNIFICANT CHANGE UP (ref 1.6–2.6)
MCHC RBC-ENTMCNC: 27.1 PG — SIGNIFICANT CHANGE UP (ref 27–34)
MCHC RBC-ENTMCNC: 32.1 % — SIGNIFICANT CHANGE UP (ref 32–36)
MCV RBC AUTO: 84.6 FL — SIGNIFICANT CHANGE UP (ref 80–100)
MONOCYTES # BLD AUTO: 0.57 K/UL — SIGNIFICANT CHANGE UP (ref 0–0.9)
MONOCYTES NFR BLD AUTO: 9.6 % — SIGNIFICANT CHANGE UP (ref 2–14)
NEUTROPHILS # BLD AUTO: 3.39 K/UL — SIGNIFICANT CHANGE UP (ref 1.8–7.4)
NEUTROPHILS NFR BLD AUTO: 57.3 % — SIGNIFICANT CHANGE UP (ref 43–77)
PHOSPHATE SERPL-MCNC: 1.8 MG/DL — LOW (ref 2.5–4.5)
PLATELET # BLD AUTO: 170 K/UL — SIGNIFICANT CHANGE UP (ref 150–400)
PMV BLD: 9.7 FL — SIGNIFICANT CHANGE UP (ref 7–13)
POTASSIUM SERPL-MCNC: 3 MMOL/L — LOW (ref 3.5–5.3)
POTASSIUM SERPL-SCNC: 3 MMOL/L — LOW (ref 3.5–5.3)
RBC # BLD: 3.76 M/UL — LOW (ref 3.8–5.2)
RBC # FLD: 17.7 % — HIGH (ref 10.3–14.5)
SODIUM SERPL-SCNC: 137 MMOL/L — SIGNIFICANT CHANGE UP (ref 135–145)
WBC # BLD: 5.91 K/UL — SIGNIFICANT CHANGE UP (ref 3.8–10.5)
WBC # FLD AUTO: 5.91 K/UL — SIGNIFICANT CHANGE UP (ref 3.8–10.5)

## 2017-02-05 RX ADMIN — Medication 1 TABLET(S): at 12:43

## 2017-02-05 RX ADMIN — PIPERACILLIN AND TAZOBACTAM 25 GRAM(S): 4; .5 INJECTION, POWDER, LYOPHILIZED, FOR SOLUTION INTRAVENOUS at 21:54

## 2017-02-05 RX ADMIN — HEPARIN SODIUM 5000 UNIT(S): 5000 INJECTION INTRAVENOUS; SUBCUTANEOUS at 06:35

## 2017-02-05 RX ADMIN — Medication 1 DROP(S): at 18:51

## 2017-02-05 RX ADMIN — Medication 1 APPLICATION(S): at 21:55

## 2017-02-05 RX ADMIN — PIPERACILLIN AND TAZOBACTAM 25 GRAM(S): 4; .5 INJECTION, POWDER, LYOPHILIZED, FOR SOLUTION INTRAVENOUS at 06:39

## 2017-02-05 RX ADMIN — Medication 1 APPLICATION(S): at 18:51

## 2017-02-05 RX ADMIN — NYSTATIN CREAM 1 APPLICATION(S): 100000 CREAM TOPICAL at 18:50

## 2017-02-05 RX ADMIN — Medication 1 APPLICATION(S): at 06:32

## 2017-02-05 RX ADMIN — NYSTATIN CREAM 1 APPLICATION(S): 100000 CREAM TOPICAL at 06:34

## 2017-02-05 RX ADMIN — Medication 1 DROP(S): at 06:32

## 2017-02-05 RX ADMIN — MIRTAZAPINE 15 MILLIGRAM(S): 45 TABLET, ORALLY DISINTEGRATING ORAL at 21:55

## 2017-02-05 RX ADMIN — HEPARIN SODIUM 5000 UNIT(S): 5000 INJECTION INTRAVENOUS; SUBCUTANEOUS at 18:50

## 2017-02-05 RX ADMIN — Medication 81 MILLIGRAM(S): at 12:43

## 2017-02-05 RX ADMIN — PIPERACILLIN AND TAZOBACTAM 25 GRAM(S): 4; .5 INJECTION, POWDER, LYOPHILIZED, FOR SOLUTION INTRAVENOUS at 14:22

## 2017-02-05 RX ADMIN — CLOPIDOGREL BISULFATE 75 MILLIGRAM(S): 75 TABLET, FILM COATED ORAL at 12:43

## 2017-02-06 LAB
BASOPHILS # BLD AUTO: 0.02 K/UL — SIGNIFICANT CHANGE UP (ref 0–0.2)
BASOPHILS NFR BLD AUTO: 0.3 % — SIGNIFICANT CHANGE UP (ref 0–2)
BUN SERPL-MCNC: 2 MG/DL — LOW (ref 7–23)
CALCIUM SERPL-MCNC: 7.9 MG/DL — LOW (ref 8.4–10.5)
CHLORIDE SERPL-SCNC: 103 MMOL/L — SIGNIFICANT CHANGE UP (ref 98–107)
CO2 SERPL-SCNC: 25 MMOL/L — SIGNIFICANT CHANGE UP (ref 22–31)
CREAT SERPL-MCNC: 0.72 MG/DL — SIGNIFICANT CHANGE UP (ref 0.5–1.3)
EOSINOPHIL # BLD AUTO: 0.13 K/UL — SIGNIFICANT CHANGE UP (ref 0–0.5)
EOSINOPHIL NFR BLD AUTO: 2 % — SIGNIFICANT CHANGE UP (ref 0–6)
GLUCOSE SERPL-MCNC: 100 MG/DL — HIGH (ref 70–99)
HCT VFR BLD CALC: 34.2 % — LOW (ref 34.5–45)
HGB BLD-MCNC: 11 G/DL — LOW (ref 11.5–15.5)
IMM GRANULOCYTES NFR BLD AUTO: 0.2 % — SIGNIFICANT CHANGE UP (ref 0–1.5)
LYMPHOCYTES # BLD AUTO: 1.8 K/UL — SIGNIFICANT CHANGE UP (ref 1–3.3)
LYMPHOCYTES # BLD AUTO: 27.4 % — SIGNIFICANT CHANGE UP (ref 13–44)
MAGNESIUM SERPL-MCNC: 1.5 MG/DL — LOW (ref 1.6–2.6)
MCHC RBC-ENTMCNC: 27.2 PG — SIGNIFICANT CHANGE UP (ref 27–34)
MCHC RBC-ENTMCNC: 32.2 % — SIGNIFICANT CHANGE UP (ref 32–36)
MCV RBC AUTO: 84.7 FL — SIGNIFICANT CHANGE UP (ref 80–100)
MONOCYTES # BLD AUTO: 0.46 K/UL — SIGNIFICANT CHANGE UP (ref 0–0.9)
MONOCYTES NFR BLD AUTO: 7 % — SIGNIFICANT CHANGE UP (ref 2–14)
NEUTROPHILS # BLD AUTO: 4.15 K/UL — SIGNIFICANT CHANGE UP (ref 1.8–7.4)
NEUTROPHILS NFR BLD AUTO: 63.1 % — SIGNIFICANT CHANGE UP (ref 43–77)
PHOSPHATE SERPL-MCNC: 2 MG/DL — LOW (ref 2.5–4.5)
PLATELET # BLD AUTO: 165 K/UL — SIGNIFICANT CHANGE UP (ref 150–400)
PMV BLD: 9.8 FL — SIGNIFICANT CHANGE UP (ref 7–13)
POTASSIUM SERPL-MCNC: 3.1 MMOL/L — LOW (ref 3.5–5.3)
POTASSIUM SERPL-SCNC: 3.1 MMOL/L — LOW (ref 3.5–5.3)
RBC # BLD: 4.04 M/UL — SIGNIFICANT CHANGE UP (ref 3.8–5.2)
RBC # FLD: 17.4 % — HIGH (ref 10.3–14.5)
SODIUM SERPL-SCNC: 140 MMOL/L — SIGNIFICANT CHANGE UP (ref 135–145)
WBC # BLD: 6.57 K/UL — SIGNIFICANT CHANGE UP (ref 3.8–10.5)
WBC # FLD AUTO: 6.57 K/UL — SIGNIFICANT CHANGE UP (ref 3.8–10.5)

## 2017-02-06 PROCEDURE — 99232 SBSQ HOSP IP/OBS MODERATE 35: CPT

## 2017-02-06 RX ORDER — POTASSIUM CHLORIDE 20 MEQ
10 PACKET (EA) ORAL ONCE
Qty: 0 | Refills: 0 | Status: COMPLETED | OUTPATIENT
Start: 2017-02-06 | End: 2017-02-06

## 2017-02-06 RX ORDER — MAGNESIUM SULFATE 500 MG/ML
2 VIAL (ML) INJECTION ONCE
Qty: 0 | Refills: 0 | Status: COMPLETED | OUTPATIENT
Start: 2017-02-06 | End: 2017-02-06

## 2017-02-06 RX ORDER — POTASSIUM PHOSPHATE, MONOBASIC POTASSIUM PHOSPHATE, DIBASIC 236; 224 MG/ML; MG/ML
15 INJECTION, SOLUTION INTRAVENOUS ONCE
Qty: 0 | Refills: 0 | Status: COMPLETED | OUTPATIENT
Start: 2017-02-06 | End: 2017-02-06

## 2017-02-06 RX ADMIN — PIPERACILLIN AND TAZOBACTAM 25 GRAM(S): 4; .5 INJECTION, POWDER, LYOPHILIZED, FOR SOLUTION INTRAVENOUS at 05:36

## 2017-02-06 RX ADMIN — Medication 50 GRAM(S): at 17:10

## 2017-02-06 RX ADMIN — Medication 1 APPLICATION(S): at 05:37

## 2017-02-06 RX ADMIN — CLOPIDOGREL BISULFATE 75 MILLIGRAM(S): 75 TABLET, FILM COATED ORAL at 12:08

## 2017-02-06 RX ADMIN — Medication 100 MILLIEQUIVALENT(S): at 15:12

## 2017-02-06 RX ADMIN — Medication 1 DROP(S): at 05:37

## 2017-02-06 RX ADMIN — Medication 81 MILLIGRAM(S): at 12:08

## 2017-02-06 RX ADMIN — MIRTAZAPINE 15 MILLIGRAM(S): 45 TABLET, ORALLY DISINTEGRATING ORAL at 21:40

## 2017-02-06 RX ADMIN — NYSTATIN CREAM 1 APPLICATION(S): 100000 CREAM TOPICAL at 05:36

## 2017-02-06 RX ADMIN — Medication 1 TABLET(S): at 12:08

## 2017-02-06 RX ADMIN — Medication 1 APPLICATION(S): at 21:42

## 2017-02-06 RX ADMIN — PIPERACILLIN AND TAZOBACTAM 25 GRAM(S): 4; .5 INJECTION, POWDER, LYOPHILIZED, FOR SOLUTION INTRAVENOUS at 15:10

## 2017-02-06 RX ADMIN — NYSTATIN CREAM 1 APPLICATION(S): 100000 CREAM TOPICAL at 17:27

## 2017-02-06 RX ADMIN — POTASSIUM PHOSPHATE, MONOBASIC POTASSIUM PHOSPHATE, DIBASIC 62.5 MILLIMOLE(S): 236; 224 INJECTION, SOLUTION INTRAVENOUS at 18:08

## 2017-02-06 RX ADMIN — HEPARIN SODIUM 5000 UNIT(S): 5000 INJECTION INTRAVENOUS; SUBCUTANEOUS at 17:26

## 2017-02-06 RX ADMIN — HEPARIN SODIUM 5000 UNIT(S): 5000 INJECTION INTRAVENOUS; SUBCUTANEOUS at 05:36

## 2017-02-06 RX ADMIN — Medication 100 MILLIEQUIVALENT(S): at 12:12

## 2017-02-06 RX ADMIN — Medication 1 DROP(S): at 17:26

## 2017-02-06 RX ADMIN — Medication 1 APPLICATION(S): at 17:27

## 2017-02-06 RX ADMIN — PIPERACILLIN AND TAZOBACTAM 25 GRAM(S): 4; .5 INJECTION, POWDER, LYOPHILIZED, FOR SOLUTION INTRAVENOUS at 21:40

## 2017-02-07 VITALS — WEIGHT: 113.98 LBS

## 2017-02-07 LAB
BACTERIA BLD CULT: SIGNIFICANT CHANGE UP
BACTERIA BLD CULT: SIGNIFICANT CHANGE UP
BUN SERPL-MCNC: 2 MG/DL — LOW (ref 7–23)
CALCIUM SERPL-MCNC: 7.7 MG/DL — LOW (ref 8.4–10.5)
CHLORIDE SERPL-SCNC: 101 MMOL/L — SIGNIFICANT CHANGE UP (ref 98–107)
CO2 SERPL-SCNC: 29 MMOL/L — SIGNIFICANT CHANGE UP (ref 22–31)
CREAT SERPL-MCNC: 0.81 MG/DL — SIGNIFICANT CHANGE UP (ref 0.5–1.3)
GLUCOSE SERPL-MCNC: 92 MG/DL — SIGNIFICANT CHANGE UP (ref 70–99)
HCT VFR BLD CALC: 31.3 % — LOW (ref 34.5–45)
HGB BLD-MCNC: 10.1 G/DL — LOW (ref 11.5–15.5)
MAGNESIUM SERPL-MCNC: 2 MG/DL — SIGNIFICANT CHANGE UP (ref 1.6–2.6)
MCHC RBC-ENTMCNC: 27.3 PG — SIGNIFICANT CHANGE UP (ref 27–34)
MCHC RBC-ENTMCNC: 32.3 % — SIGNIFICANT CHANGE UP (ref 32–36)
MCV RBC AUTO: 84.6 FL — SIGNIFICANT CHANGE UP (ref 80–100)
PHOSPHATE SERPL-MCNC: 2.8 MG/DL — SIGNIFICANT CHANGE UP (ref 2.5–4.5)
PLATELET # BLD AUTO: 159 K/UL — SIGNIFICANT CHANGE UP (ref 150–400)
PMV BLD: 9.6 FL — SIGNIFICANT CHANGE UP (ref 7–13)
POTASSIUM SERPL-MCNC: 3.7 MMOL/L — SIGNIFICANT CHANGE UP (ref 3.5–5.3)
POTASSIUM SERPL-SCNC: 3.7 MMOL/L — SIGNIFICANT CHANGE UP (ref 3.5–5.3)
RBC # BLD: 3.7 M/UL — LOW (ref 3.8–5.2)
RBC # FLD: 17.7 % — HIGH (ref 10.3–14.5)
SODIUM SERPL-SCNC: 138 MMOL/L — SIGNIFICANT CHANGE UP (ref 135–145)
WBC # BLD: 6.08 K/UL — SIGNIFICANT CHANGE UP (ref 3.8–10.5)
WBC # FLD AUTO: 6.08 K/UL — SIGNIFICANT CHANGE UP (ref 3.8–10.5)

## 2017-02-07 RX ORDER — LISINOPRIL 2.5 MG/1
1 TABLET ORAL
Qty: 0 | Refills: 0 | COMMUNITY

## 2017-02-07 RX ORDER — ACETAMINOPHEN 500 MG
2 TABLET ORAL
Qty: 0 | Refills: 0 | COMMUNITY
Start: 2017-02-07

## 2017-02-07 RX ORDER — MIDODRINE HYDROCHLORIDE 2.5 MG/1
1 TABLET ORAL
Qty: 0 | Refills: 0 | COMMUNITY
Start: 2017-02-07

## 2017-02-07 RX ORDER — PIPERACILLIN AND TAZOBACTAM 4; .5 G/20ML; G/20ML
0 INJECTION, POWDER, LYOPHILIZED, FOR SOLUTION INTRAVENOUS
Qty: 0 | Refills: 0 | COMMUNITY
Start: 2017-02-07

## 2017-02-07 RX ORDER — SIMVASTATIN 20 MG/1
1 TABLET, FILM COATED ORAL
Qty: 0 | Refills: 0 | COMMUNITY

## 2017-02-07 RX ORDER — NYSTATIN CREAM 100000 [USP'U]/G
1 CREAM TOPICAL
Qty: 0 | Refills: 0 | COMMUNITY
Start: 2017-02-07

## 2017-02-07 RX ADMIN — Medication 1 DROP(S): at 06:18

## 2017-02-07 RX ADMIN — PIPERACILLIN AND TAZOBACTAM 25 GRAM(S): 4; .5 INJECTION, POWDER, LYOPHILIZED, FOR SOLUTION INTRAVENOUS at 06:17

## 2017-02-07 RX ADMIN — Medication 1 APPLICATION(S): at 06:17

## 2017-02-07 RX ADMIN — Medication 1 DROP(S): at 18:17

## 2017-02-07 RX ADMIN — NYSTATIN CREAM 1 APPLICATION(S): 100000 CREAM TOPICAL at 06:17

## 2017-02-07 RX ADMIN — PIPERACILLIN AND TAZOBACTAM 25 GRAM(S): 4; .5 INJECTION, POWDER, LYOPHILIZED, FOR SOLUTION INTRAVENOUS at 13:14

## 2017-02-07 RX ADMIN — HEPARIN SODIUM 5000 UNIT(S): 5000 INJECTION INTRAVENOUS; SUBCUTANEOUS at 18:16

## 2017-02-07 RX ADMIN — HEPARIN SODIUM 5000 UNIT(S): 5000 INJECTION INTRAVENOUS; SUBCUTANEOUS at 06:17

## 2017-02-07 RX ADMIN — NYSTATIN CREAM 1 APPLICATION(S): 100000 CREAM TOPICAL at 18:16

## 2017-02-07 RX ADMIN — Medication 1 APPLICATION(S): at 18:17

## 2017-02-07 NOTE — DISCHARGE NOTE ADULT - ADDITIONAL INSTRUCTIONS
Follow up with  PMD within one week of discharge. Call for appointment. Return to ED for any concerning symptoms. Continue medications as prescribed. Low salt, low fat, low cholesterol diet. Continue 4 more days of IV Abx. as per Dr Suero.

## 2017-02-07 NOTE — DISCHARGE NOTE ADULT - HOSPITAL COURSE
89 yo female PMH of HTN, CAD, CHF, PVD, OA, DJD referred from [facility] for acute renal failure. Pt noted to have Cr of 5, new, suspected sepsis. Pt nonverbal, unable to provide further hx.  Severe sepsis UTI  -zosyn  -gentle IVF   -MICU consulted- no need for MICU at this time- Fluid bolus and started midodrine 10mg TID  -1/26-BCX-NEG, Ucx- insufficient groPrerenal/post renal/renal azotemia due to     . Check urine lytes. Monitor I & O’s, Daily weight.. - CT abdomen -- no evidence of abscess    YESSI  -most likely prerenal vs ATN given sepsis  -will trend Cr in setting of fluids  -avoid nephrotoxins  -US renal- Parenchymal renal disease. Nondistended bladder with internal soft tissue of uncertain etiology.    Hyperkalemia  -6.7 to 5.1 s/p bicarb, insulin + dextrose  -will monitor K q12h  -will give kayexalate 15 mg X 1-6.7 to 5.1 s/p bicarb, insulin + dextrose  -improved    Decubitus ulcer  -pt has b/l decubitus ulcers in ankles  - wound care consulted- recs in  -Podiatry consulted-local wound care-Lt-Xeroform/DSD, Rt Aquacel+DSD  -B/L foot xray-no radiographic evidence of osteo    CAD  -c/w asa, plavix, statin.   -Echo-N. LV function, Mild-moderate mitral regurgitation.    Hypertension  -will hold bp medication in setting of sepsis.    CHF   -gentle IV hydration  -will hold lisinopril in setting of YESSI.    2/7- Patient can go to [facility] as per Dr Suero with 4 more days of Abx.

## 2017-02-07 NOTE — DISCHARGE NOTE ADULT - CARE PLAN
Principal Discharge DX:	YESSI (acute kidney injury)  Goal:	Followup with PMD and take all medications prescribed.  Instructions for follow-up, activity and diet:	Followup with PMD and take all medications prescribed. Low salt, low fat, low cholesterol diet  Secondary Diagnosis:	Altered mental status  Goal:	Followup with PMD and take all medications prescribed.  Instructions for follow-up, activity and diet:	Followup with PMD and take all medications prescribed. Low salt, low fat, low cholesterol diet  Secondary Diagnosis:	Hypertension  Goal:	Followup with PMD and take all medications prescribed.  Instructions for follow-up, activity and diet:	Followup with PMD and take all medications prescribed. Low salt, low fat, low cholesterol diet  Secondary Diagnosis:	Hyperkalemia  Goal:	Followup with PMD and take all medications prescribed.  Instructions for follow-up, activity and diet:	Followup with PMD and take all medications prescribed. Low salt, low fat, low cholesterol diet  Secondary Diagnosis:	Urinary tract infection, site unspecified  Goal:	Followup with PMD and take all medications prescribed.  Instructions for follow-up, activity and diet:	Followup with PMD and take all medications prescribed. Low salt, low fat, low cholesterol diet  Secondary Diagnosis:	Severe sepsis  Goal:	Followup with PMD and take all medications prescribed.  Instructions for follow-up, activity and diet:	Followup with PMD and take all medications prescribed. Low salt, low fat, low cholesterol diet

## 2017-02-07 NOTE — DISCHARGE NOTE ADULT - SECONDARY DIAGNOSIS.
Altered mental status Hypertension Hyperkalemia Severe sepsis Urinary tract infection, site unspecified

## 2017-02-07 NOTE — DISCHARGE NOTE ADULT - REASON FOR ADMISSION
UTI / Prerenal/post renal/renal azotemia due to     . Check urine lytes. Monitor I & O’s, Daily weight.

## 2017-02-07 NOTE — DISCHARGE NOTE ADULT - PLAN OF CARE
Followup with PMD and take all medications prescribed. Followup with PMD and take all medications prescribed. Low salt, low fat, low cholesterol diet

## 2017-02-07 NOTE — DISCHARGE NOTE ADULT - MEDICATION SUMMARY - MEDICATIONS TO TAKE
I will START or STAY ON the medications listed below when I get home from the hospital:    aspirin 81 mg oral tablet  -- 1 tab(s) by mouth once a day  -- Indication: For CAD (coronary artery disease)    acetaminophen 325 mg oral tablet  -- 2 tab(s) by mouth every 6 hours, As needed, For Temp greater than 38 C (100.4 F)  -- Indication: For pain or fever    Remeron 15 mg oral tablet  -- 1 tab(s) by mouth once a day (at bedtime)  -- Indication: For Antidepressant    Plavix 75 mg oral tablet  -- 1 tab(s) by mouth once a day  -- Indication: For CAD (coronary artery disease)    Hydrocortisone 1% In Absorbase topical ointment  -- Apply on skin lower extremeties b/l twice daily   -- Indication: For Skin care    nystatin 100,000 units/g topical powder  -- 1 application on skin 2 times a day to affected areas.  -- Indication: For Skin care    midodrine 5 mg oral tablet  -- 1 tab(s) by mouth every 8 hours  -- Indication: For blood pressur    Artificial Tears ophthalmic solution  -- 1 drop(s) to each eye every 12 hours   -- Indication: For eyes    Artificial Tears ophthalmic ointment  -- 1 application to each affected eye once a day (at bedtime)  -- Indication: For eyes    Zosyn 3 g-0.375 g intravenous injection  --  intravenous every 8 hours for 4 more days - stop after 2/11/17.  -- Indication: For UTI -bacteremia    omeprazole 20 mg oral delayed release capsule  -- 1 cap(s) by mouth once a day  -- Indication: For gerd    Calcium 500+D oral tablet, chewable  -- 1 tab(s) by mouth 2 times a day  -- Indication: For vitamin    multivitamin  -- 1 tab(s) by mouth once a day  -- Indication: For vitamin

## 2017-02-07 NOTE — DISCHARGE NOTE ADULT - NS AS DC HF EDUCATION INSTRUCTIONS
Low salt diet/Activities as tolerated/Monitor Weight Daily/Call Primary Care Provider for follow-up after discharge/Report weight gain of 2 or more pounds in one day or 3 or more pounds in one week, worsening shortness of breath, fatigue, weakness, increased swelling of hands and feet to primary care provider

## 2017-02-07 NOTE — DISCHARGE NOTE ADULT - PATIENT PORTAL LINK FT
“You can access the [facility] Patient Portal, offered by [facility], by registering with the following website: [email or web address]”

## 2017-02-07 NOTE — DISCHARGE NOTE ADULT - INSTRUCTIONS
Low salt, low fat, low cholesterol diet Left toes with vascular- Right and Left toes with vascular wound- apply xeroform

## 2017-02-07 NOTE — DISCHARGE NOTE ADULT - MEDICATION SUMMARY - MEDICATIONS TO STOP TAKING
I will STOP taking the medications listed below when I get home from the hospital:    lisinopril 5 mg oral tablet  -- 1 tab(s) by mouth once a day    silver sulfADIAZINE 1% topical cream  -- Apply on skin once daily to left food

## 2017-06-21 NOTE — PROVIDER CONTACT NOTE (OTHER) - DATE AND TIME:
01-Feb-2017 01:54
01-Feb-2017 04:15
01-Feb-2017 06:49
01-Feb-2017 07:02
02-Feb-2017 05:15
27-Jan-2017 21:30
28-Jan-2017 02:00
29-Jan-2017 21:30
29-Jan-2017 21:40
29-Jan-2017 23:08
30-Jan-2017 02:33
30-Jan-2017 06:50
31-Jan-2017 21:10
31-Jan-2017 23:05
hard copy

## 2017-08-10 NOTE — DIETITIAN INITIAL EVALUATION ADULT. - PROBLEM SELECTOR PROBLEM 2
Subjective:       Patient ID: Halley Moreno is a 44 y.o. female who presents for Follow-up; Knee Pain (right); Diabetes; and Hypertension      Diabetes   She presents for her follow-up diabetic visit. She has type 2 diabetes mellitus. Her disease course has been stable. There are no hypoglycemic associated symptoms. Pertinent negatives for hypoglycemia include no dizziness or headaches. There are no diabetic associated symptoms. Pertinent negatives for diabetes include no blurred vision, no chest pain, no fatigue, no visual change and no weakness. There are no hypoglycemic complications. Symptoms are stable. There are no diabetic complications. Risk factors for coronary artery disease include diabetes mellitus, obesity, hypertension and sedentary lifestyle. Current diabetic treatment includes oral agent (monotherapy). She is compliant with treatment all of the time. Her weight is stable. She is following a generally healthy diet. She rarely participates in exercise. There is no change in her home blood glucose trend. Her breakfast blood glucose range is generally  mg/dl. An ACE inhibitor/angiotensin II receptor blocker is being taken.   Knee Pain    The incident occurred 5 to 7 days ago. There was no injury mechanism. The pain is present in the right knee. The quality of the pain is described as aching. The pain is at a severity of 9/10. The pain is severe. The pain has been constant since onset. Pertinent negatives include no inability to bear weight, loss of motion, muscle weakness, numbness or tingling. The symptoms are aggravated by movement and weight bearing. She has tried NSAIDs for the symptoms. The treatment provided mild relief.   Hypertension   This is a chronic problem. The current episode started more than 1 year ago. The problem is unchanged. The problem is controlled. Associated symptoms include peripheral edema. Pertinent negatives include no blurred vision, chest pain, headaches,  palpitations or shortness of breath. There are no associated agents to hypertension. Risk factors for coronary artery disease include diabetes mellitus, dyslipidemia and obesity. Past treatments include ACE inhibitors. The current treatment provides moderate improvement. Compliance problems include exercise.  There is no history of kidney disease or heart failure. There is no history of chronic renal disease.        Review of Systems   Constitutional: Negative for chills, fatigue and fever.   HENT: Negative for congestion, rhinorrhea and sinus pressure.    Eyes: Negative for blurred vision and visual disturbance.   Respiratory: Negative for cough, chest tightness and shortness of breath.    Cardiovascular: Negative for chest pain, palpitations and leg swelling.   Gastrointestinal: Negative for abdominal pain, diarrhea, nausea and vomiting.   Genitourinary: Negative for decreased urine volume, dysuria and hematuria.   Musculoskeletal: Positive for arthralgias (right knee). Negative for myalgias.   Skin: Negative for rash.   Neurological: Negative for dizziness, tingling, weakness, light-headedness, numbness and headaches.       Objective:      Physical Exam   Constitutional: She is oriented to person, place, and time. Vital signs are normal. She appears well-developed and well-nourished. No distress.   HENT:   Head: Normocephalic and atraumatic.   Right Ear: Hearing and external ear normal.   Left Ear: Hearing and external ear normal.   Nose: Nose normal.   Mouth/Throat: Uvula is midline and oropharynx is clear and moist. No oropharyngeal exudate.   Eyes: EOM and lids are normal.   Neck: Normal range of motion.   Cardiovascular: Normal rate, regular rhythm, normal heart sounds and intact distal pulses.    No murmur heard.  Pulmonary/Chest: Effort normal and breath sounds normal. She has no wheezes.   Abdominal: Soft. Bowel sounds are normal. There is no tenderness.   Musculoskeletal: Normal range of motion. She  exhibits no edema.        Right knee: She exhibits normal range of motion and no swelling. Tenderness (along medial knee and along pes anserine bursa) found. Medial joint line tenderness noted. No patellar tendon tenderness noted.   Neurological: She is alert and oriented to person, place, and time. Coordination and gait normal.   Skin: Skin is warm, dry and intact. No rash noted. She is not diaphoretic.   Psychiatric: She has a normal mood and affect.   Vitals reviewed.      Assessment:       1. Right knee pain, unspecified chronicity    2. Tendonitis of knee, right    3. Controlled type 2 diabetes mellitus with diabetic nephropathy, without long-term current use of insulin    4. Hypertension associated with diabetes        Plan:       -- x-ray right knee, rest, ice, elevation  -- referral to Orthopedics  -- blood sugar in office is 133, last HbA1c elevated to 11.5 but possibly erroneous reading  -- continue metformin, recheck HbA1c in October  -- BP controlled with lisinopril    RTC in 3 months with HbA1c, bmp     Yamila Tejeda MD     YESSI (acute kidney injury)

## 2018-03-12 NOTE — ED ADULT TRIAGE NOTE - ESI TRIAGE ACUITY LEVEL, MLM
2
I will SWITCH the dose or number of times a day I take the medications listed below when I get home from the hospital:  None

## 2021-06-09 NOTE — H&P ADULT. - GEN GEN HX ROS MEA POS PC
anorexia Information: Selecting Yes will display possible errors in your note based on the variables you have selected. This validation is only offered as a suggestion for you. PLEASE NOTE THAT THE VALIDATION TEXT WILL BE REMOVED WHEN YOU FINALIZE YOUR NOTE. IF YOU WANT TO FAX A PRELIMINARY NOTE YOU WILL NEED TO TOGGLE THIS TO 'NO' IF YOU DO NOT WANT IT IN YOUR FAXED NOTE.

## 2023-03-10 NOTE — ED PROVIDER NOTE - PMH
CAD (coronary artery disease)    CHF (congestive heart failure)    DJD (degenerative joint disease)    Dry eye syndrome    Hyperlipidemia    Hypertension    Nonhealing nonsurgical wound    Osteopenia    PVD (peripheral vascular disease)    Xerosis of skin No